# Patient Record
Sex: FEMALE | Race: BLACK OR AFRICAN AMERICAN | NOT HISPANIC OR LATINO | Employment: UNEMPLOYED | RURAL
[De-identification: names, ages, dates, MRNs, and addresses within clinical notes are randomized per-mention and may not be internally consistent; named-entity substitution may affect disease eponyms.]

---

## 2017-02-23 ENCOUNTER — HISTORICAL (OUTPATIENT)
Dept: ADMINISTRATIVE | Facility: HOSPITAL | Age: 32
End: 2017-02-23

## 2017-02-27 LAB
LAB AP CLINICAL INFORMATION: NORMAL
LAB AP GENERAL CAT - HISTORICAL: NORMAL
LAB AP INTERPRETATION/RESULT - HISTORICAL: NEGATIVE
LAB AP SPECIMEN ADEQUACY - HISTORICAL: NORMAL
LAB AP SPECIMEN SUBMITTED - HISTORICAL: NORMAL

## 2019-04-04 ENCOUNTER — HISTORICAL (OUTPATIENT)
Dept: ADMINISTRATIVE | Facility: HOSPITAL | Age: 34
End: 2019-04-04

## 2019-04-05 LAB
LAB AP COMMENTS: NORMAL
LAB AP GENERAL CAT - HISTORICAL: NORMAL
LAB AP INTERPRETATION/RESULT - HISTORICAL: NEGATIVE
LAB AP SPECIMEN ADEQUACY - HISTORICAL: NORMAL
LAB AP SPECIMEN SUBMITTED - HISTORICAL: NORMAL

## 2020-10-21 ENCOUNTER — HISTORICAL (OUTPATIENT)
Dept: ADMINISTRATIVE | Facility: HOSPITAL | Age: 35
End: 2020-10-21

## 2020-10-21 LAB
ABO: NORMAL
ANTIBODY SCREEN: NORMAL
BACTERIA #/AREA URNS HPF: ABNORMAL /HPF
BASOPHILS # BLD AUTO: 0.03 X10E3/UL (ref 0–0.2)
BASOPHILS NFR BLD AUTO: 0.4 % (ref 0–1)
BILIRUB UR QL STRIP: NEGATIVE MG/DL
CLARITY UR: ABNORMAL
CLARITY UR: ABNORMAL
COLOR UR: YELLOW
COLOR UR: YELLOW
EOSINOPHIL # BLD AUTO: 0.14 X10E3/UL (ref 0–0.5)
EOSINOPHIL NFR BLD AUTO: 1.7 % (ref 1–4)
ERYTHROCYTE [DISTWIDTH] IN BLOOD BY AUTOMATED COUNT: 12.3 % (ref 11.5–14.5)
GLUCOSE UR STRIP-MCNC: NEGATIVE MG/DL
HCG SERPL-ACNC: NORMAL MIU/ML
HCG UR QL IA.RAPID: POSITIVE
HCG UR QL IA.RAPID: POSITIVE
HCT VFR BLD AUTO: 40.8 % (ref 38–47)
HGB BLD-MCNC: 13.8 G/DL (ref 12–16)
IMM GRANULOCYTES # BLD AUTO: 0.01 X10E3/UL (ref 0–0.04)
IMM GRANULOCYTES NFR BLD: 0.1 % (ref 0–0.4)
KETONES UR STRIP-SCNC: NEGATIVE MG/DL
LEUKOCYTE ESTERASE UR QL STRIP: ABNORMAL LEU/UL
LYMPHOCYTES # BLD AUTO: 1.58 X10E3/UL (ref 1–4.8)
LYMPHOCYTES NFR BLD AUTO: 19.4 % (ref 27–41)
MCH RBC QN AUTO: 30.6 PG (ref 27–31)
MCHC RBC AUTO-ENTMCNC: 33.8 G/DL (ref 32–36)
MCV RBC AUTO: 90.5 FL (ref 80–96)
MONOCYTES # BLD AUTO: 0.56 X10E3/UL (ref 0–0.8)
MONOCYTES NFR BLD AUTO: 6.9 % (ref 2–6)
MPC BLD CALC-MCNC: 10.3 FL (ref 9.4–12.4)
MUCOUS THREADS #/AREA URNS HPF: ABNORMAL /HPF
NEUTROPHILS # BLD AUTO: 5.84 X10E3/UL (ref 1.8–7.7)
NEUTROPHILS NFR BLD AUTO: 71.5 % (ref 53–65)
NITRITE UR QL STRIP: NEGATIVE
NRBC # BLD AUTO: 0 X10E3/UL (ref 0–0)
NRBC, AUTO (.00): 0 /100 (ref 0–0)
PH UR STRIP: 6.5 PH UNITS (ref 5–8)
PLATELET # BLD AUTO: 274 X10E3/UL (ref 150–400)
PROT UR QL STRIP: ABNORMAL MG/DL
RBC # BLD AUTO: 4.51 X10E6/UL (ref 4.2–5.4)
RBC # UR STRIP: ABNORMAL ERY/UL
RBC #/AREA URNS HPF: ABNORMAL /HPF (ref 0–3)
RH TYPE: NORMAL
SP GR UR STRIP: 1.02 (ref 1–1.03)
SQUAMOUS #/AREA URNS LPF: ABNORMAL /LPF
TRICHOMONAS #/AREA URNS HPF: ABNORMAL /HPF
UROBILINOGEN UR STRIP-ACNC: 0.2 EU/DL
WBC # BLD AUTO: 8.16 X10E3/UL (ref 4.5–11)
WBC #/AREA URNS HPF: ABNORMAL /HPF (ref 0–5)

## 2020-10-23 LAB
REPORT: NORMAL

## 2020-11-12 ENCOUNTER — HISTORICAL (OUTPATIENT)
Dept: ADMINISTRATIVE | Facility: HOSPITAL | Age: 35
End: 2020-11-12

## 2020-11-12 LAB
APTT PPP: 31.3 SECONDS (ref 25.2–37.3)
BASOPHILS # BLD AUTO: 0.03 X10E3/UL (ref 0–0.2)
BASOPHILS NFR BLD AUTO: 0.4 % (ref 0–1)
EOSINOPHIL # BLD AUTO: 0.27 X10E3/UL (ref 0–0.5)
EOSINOPHIL NFR BLD AUTO: 3.3 % (ref 1–4)
ERYTHROCYTE [DISTWIDTH] IN BLOOD BY AUTOMATED COUNT: 11.9 % (ref 11.5–14.5)
HCT VFR BLD AUTO: 40.1 % (ref 38–47)
HGB BLD-MCNC: 13.4 G/DL (ref 12–16)
IMM GRANULOCYTES # BLD AUTO: 0.03 X10E3/UL (ref 0–0.04)
IMM GRANULOCYTES NFR BLD: 0.4 % (ref 0–0.4)
INR BLD: 1.05 (ref 0–3.3)
LYMPHOCYTES # BLD AUTO: 2.71 X10E3/UL (ref 1–4.8)
LYMPHOCYTES NFR BLD AUTO: 33.5 % (ref 27–41)
MAGNESIUM SERPL-MCNC: 1.8 MG/DL (ref 1.7–2.3)
MCH RBC QN AUTO: 31.2 PG (ref 27–31)
MCHC RBC AUTO-ENTMCNC: 33.4 G/DL (ref 32–36)
MCV RBC AUTO: 93.3 FL (ref 80–96)
MONOCYTES # BLD AUTO: 0.54 X10E3/UL (ref 0–0.8)
MONOCYTES NFR BLD AUTO: 6.7 % (ref 2–6)
MPC BLD CALC-MCNC: 9.8 FL (ref 9.4–12.4)
NEUTROPHILS # BLD AUTO: 4.52 X10E3/UL (ref 1.8–7.7)
NEUTROPHILS NFR BLD AUTO: 55.7 % (ref 53–65)
NRBC # BLD AUTO: 0 X10E3/UL (ref 0–0)
NRBC, AUTO (.00): 0 /100 (ref 0–0)
PLATELET # BLD AUTO: 267 X10E3/UL (ref 150–400)
PROTHROMBIN TIME: 13.2 SECONDS (ref 11.7–14.7)
RBC # BLD AUTO: 4.3 X10E6/UL (ref 4.2–5.4)
WBC # BLD AUTO: 8.1 X10E3/UL (ref 4.5–11)

## 2020-11-13 ENCOUNTER — HISTORICAL (OUTPATIENT)
Dept: ADMINISTRATIVE | Facility: HOSPITAL | Age: 35
End: 2020-11-13

## 2020-11-13 LAB
ALBUMIN SERPL BCP-MCNC: 3.2 G/DL (ref 3.5–5)
ALBUMIN/GLOB SERPL: 0.7 {RATIO}
ALP SERPL-CCNC: 72 U/L (ref 37–98)
ALT SERPL W P-5'-P-CCNC: 22 U/L (ref 13–56)
ANION GAP SERPL CALCULATED.3IONS-SCNC: 17 MMOL/L
AST SERPL W P-5'-P-CCNC: 38 U/L (ref 15–37)
BILIRUB SERPL-MCNC: 0.3 MG/DL (ref 0–1.2)
BUN SERPL-MCNC: 7 MG/DL (ref 7–18)
BUN/CREAT SERPL: 9.5
CALCIUM SERPL-MCNC: 8.8 MG/DL (ref 8.5–10.1)
CHLORIDE SERPL-SCNC: 101 MMOL/L (ref 98–107)
CK MB SERPL-MCNC: <1 NG/ML (ref 1–3.6)
CK SERPL-CCNC: 151 U/L (ref 26–192)
CO2 SERPL-SCNC: 18 MMOL/L (ref 21–32)
CREAT SERPL-MCNC: 0.74 MG/DL (ref 0.55–1.02)
GLOBULIN SER-MCNC: 4.5 G/DL (ref 2–4)
GLUCOSE SERPL-MCNC: 91 MG/DL (ref 74–106)
MYOGLOBIN SERPL-MCNC: 24 NG/ML (ref 13–71)
POTASSIUM SERPL-SCNC: 3.8 MMOL/L (ref 3.5–5.1)
PROT SERPL-MCNC: 7.7 G/DL (ref 6.4–8.2)
SODIUM SERPL-SCNC: 132 MMOL/L (ref 136–145)
TROPONIN I SERPL-MCNC: <0.017 NG/ML (ref 0–0.06)

## 2021-01-03 ENCOUNTER — HISTORICAL (OUTPATIENT)
Dept: ADMINISTRATIVE | Facility: HOSPITAL | Age: 36
End: 2021-01-03

## 2021-01-03 LAB
BACTERIA #/AREA URNS HPF: ABNORMAL /HPF
BILIRUB UR QL STRIP: NEGATIVE MG/DL
CLARITY UR: CLEAR
CLARITY UR: CLEAR
COLOR UR: ABNORMAL
COLOR UR: ABNORMAL
GLUCOSE UR STRIP-MCNC: NEGATIVE MG/DL
HCG UR QL IA.RAPID: POSITIVE
KETONES UR STRIP-SCNC: NEGATIVE MG/DL
LEUKOCYTE ESTERASE UR QL STRIP: ABNORMAL LEU/UL
MUCOUS THREADS #/AREA URNS HPF: ABNORMAL /HPF
NITRITE UR QL STRIP: NEGATIVE
PH UR STRIP: 6 PH UNITS (ref 5–8)
PROT UR QL STRIP: NEGATIVE MG/DL
RBC # UR STRIP: NEGATIVE ERY/UL
RBC #/AREA URNS HPF: ABNORMAL /HPF (ref 0–3)
SP GR UR STRIP: 1.02 (ref 1–1.03)
SQUAMOUS #/AREA URNS LPF: ABNORMAL /LPF
TRICHOMONAS #/AREA URNS HPF: ABNORMAL /HPF
UROBILINOGEN UR STRIP-ACNC: 0.2 EU/DL
WBC #/AREA URNS HPF: ABNORMAL /HPF (ref 0–5)
YEAST #/AREA URNS HPF: ABNORMAL /HPF

## 2022-07-12 ENCOUNTER — OFFICE VISIT (OUTPATIENT)
Dept: FAMILY MEDICINE | Facility: CLINIC | Age: 37
End: 2022-07-12
Payer: MEDICAID

## 2022-07-12 VITALS
RESPIRATION RATE: 17 BRPM | OXYGEN SATURATION: 98 % | SYSTOLIC BLOOD PRESSURE: 131 MMHG | WEIGHT: 293 LBS | TEMPERATURE: 99 F | DIASTOLIC BLOOD PRESSURE: 80 MMHG | HEART RATE: 86 BPM

## 2022-07-12 DIAGNOSIS — Z11.52 ENCOUNTER FOR SCREENING LABORATORY TESTING FOR COVID-19 VIRUS: Primary | ICD-10-CM

## 2022-07-12 DIAGNOSIS — U07.1 COVID-19 VIRUS INFECTION: ICD-10-CM

## 2022-07-12 LAB
CTP QC/QA: YES
FLUAV AG NPH QL: NEGATIVE
FLUBV AG NPH QL: NEGATIVE
SARS-COV-2 AG RESP QL IA.RAPID: POSITIVE

## 2022-07-12 PROCEDURE — 3079F PR MOST RECENT DIASTOLIC BLOOD PRESSURE 80-89 MM HG: ICD-10-PCS | Mod: CPTII,,, | Performed by: FAMILY MEDICINE

## 2022-07-12 PROCEDURE — 1159F PR MEDICATION LIST DOCUMENTED IN MEDICAL RECORD: ICD-10-PCS | Mod: CPTII,,, | Performed by: FAMILY MEDICINE

## 2022-07-12 PROCEDURE — 99203 OFFICE O/P NEW LOW 30 MIN: CPT | Mod: ,,, | Performed by: FAMILY MEDICINE

## 2022-07-12 PROCEDURE — 1159F MED LIST DOCD IN RCRD: CPT | Mod: CPTII,,, | Performed by: FAMILY MEDICINE

## 2022-07-12 PROCEDURE — 99203 PR OFFICE/OUTPT VISIT, NEW, LEVL III, 30-44 MIN: ICD-10-PCS | Mod: ,,, | Performed by: FAMILY MEDICINE

## 2022-07-12 PROCEDURE — 87428 SARSCOV & INF VIR A&B AG IA: CPT | Mod: RHCUB | Performed by: FAMILY MEDICINE

## 2022-07-12 PROCEDURE — 3079F DIAST BP 80-89 MM HG: CPT | Mod: CPTII,,, | Performed by: FAMILY MEDICINE

## 2022-07-12 PROCEDURE — 3075F PR MOST RECENT SYSTOLIC BLOOD PRESS GE 130-139MM HG: ICD-10-PCS | Mod: CPTII,,, | Performed by: FAMILY MEDICINE

## 2022-07-12 PROCEDURE — 3075F SYST BP GE 130 - 139MM HG: CPT | Mod: CPTII,,, | Performed by: FAMILY MEDICINE

## 2022-07-12 RX ORDER — LANOLIN ALCOHOL/MO/W.PET/CERES
1 CREAM (GRAM) TOPICAL 2 TIMES DAILY
COMMUNITY
Start: 2022-04-18

## 2022-07-12 RX ORDER — BUPROPION HYDROCHLORIDE 150 MG/1
TABLET, EXTENDED RELEASE ORAL
COMMUNITY
Start: 2022-07-04

## 2022-07-12 RX ORDER — CEPHRADINE 500 MG
CAPSULE ORAL
COMMUNITY
Start: 2022-04-18 | End: 2023-04-13

## 2022-07-12 RX ORDER — CITALOPRAM 40 MG/1
40 TABLET, FILM COATED ORAL DAILY
COMMUNITY
Start: 2022-04-15

## 2022-07-12 NOTE — LETTER
July 12, 2022      [facility]  [address] [place] [address]  Phone: [phone]  Fax: [phone]       Patient: Jeanette Houston   YOB: 1985  Date of Visit: 07/12/2022    To Whom It May Concern:    Feliberto Houston  was at [facility] on 07/12/2022. The patient may return to work/school on 07/18/2022 with no restrictions. If you have any questions or concerns, or if I can be of further assistance, please do not hesitate to contact me.    Sincerely,    Dr.Jacob Ricks

## 2022-07-12 NOTE — PROGRESS NOTES
Subjective:       Patient ID: Jeanette Houston is a 37 y.o. female.    Chief Complaint: COVID-19 Concerns (No symptoms. Possible exposure. )    Sinus drainage for several days no fever cough or fatigue    Review of Systems      Objective:      Physical Exam  Constitutional:       General: She is not in acute distress.     Appearance: She is obese. She is not ill-appearing.   HENT:      Right Ear: Tympanic membrane normal.      Left Ear: Tympanic membrane normal.      Nose: Congestion and rhinorrhea present.   Cardiovascular:      Rate and Rhythm: Normal rate and regular rhythm.   Pulmonary:      Effort: Pulmonary effort is normal.      Breath sounds: Normal breath sounds.   Neurological:      Mental Status: She is alert.         Assessment:       Problem List Items Addressed This Visit    None     Visit Diagnoses     Encounter for screening laboratory testing for COVID-19 virus    -  Primary    Relevant Orders    POCT SARS-COV2 (COVID) with Flu Antigen          Plan:     paxlovid.  Patient is morbidly obese

## 2022-10-12 ENCOUNTER — HOSPITAL ENCOUNTER (EMERGENCY)
Facility: HOSPITAL | Age: 37
Discharge: HOME | End: 2022-10-12
Payer: MEDICAID

## 2022-10-12 VITALS
RESPIRATION RATE: 20 BRPM | WEIGHT: 293 LBS | BODY MASS INDEX: 45.99 KG/M2 | DIASTOLIC BLOOD PRESSURE: 65 MMHG | HEART RATE: 80 BPM | TEMPERATURE: 99 F | SYSTOLIC BLOOD PRESSURE: 119 MMHG | HEIGHT: 67 IN | OXYGEN SATURATION: 99 %

## 2022-10-12 DIAGNOSIS — M54.30 SCIATICA, UNSPECIFIED LATERALITY: Primary | ICD-10-CM

## 2022-10-12 PROCEDURE — 63600175 PHARM REV CODE 636 W HCPCS: Performed by: NURSE PRACTITIONER

## 2022-10-12 PROCEDURE — 99283 EMERGENCY DEPT VISIT LOW MDM: CPT | Mod: ,,, | Performed by: NURSE PRACTITIONER

## 2022-10-12 PROCEDURE — 96374 THER/PROPH/DIAG INJ IV PUSH: CPT

## 2022-10-12 PROCEDURE — 99283 PR EMERGENCY DEPT VISIT,LEVEL III: ICD-10-PCS | Mod: ,,, | Performed by: NURSE PRACTITIONER

## 2022-10-12 PROCEDURE — 99285 EMERGENCY DEPT VISIT HI MDM: CPT | Mod: 25

## 2022-10-12 PROCEDURE — 96372 THER/PROPH/DIAG INJ SC/IM: CPT

## 2022-10-12 RX ORDER — KETOROLAC TROMETHAMINE 30 MG/ML
30 INJECTION, SOLUTION INTRAMUSCULAR; INTRAVENOUS
Status: DISCONTINUED | OUTPATIENT
Start: 2022-10-12 | End: 2022-10-12

## 2022-10-12 RX ORDER — DEXAMETHASONE SODIUM PHOSPHATE 4 MG/ML
6 INJECTION, SOLUTION INTRA-ARTICULAR; INTRALESIONAL; INTRAMUSCULAR; INTRAVENOUS; SOFT TISSUE
Status: COMPLETED | OUTPATIENT
Start: 2022-10-12 | End: 2022-10-12

## 2022-10-12 RX ORDER — LEVOTHYROXINE SODIUM 100 UG/1
100 TABLET ORAL
Status: ON HOLD | COMMUNITY
End: 2022-11-14 | Stop reason: HOSPADM

## 2022-10-12 RX ORDER — METHOCARBAMOL 500 MG/1
1000 TABLET, FILM COATED ORAL 3 TIMES DAILY PRN
Qty: 30 TABLET | Refills: 0 | Status: SHIPPED | OUTPATIENT
Start: 2022-10-12 | End: 2022-10-17

## 2022-10-12 RX ORDER — ORPHENADRINE CITRATE 30 MG/ML
30 INJECTION INTRAMUSCULAR; INTRAVENOUS
Status: COMPLETED | OUTPATIENT
Start: 2022-10-12 | End: 2022-10-12

## 2022-10-12 RX ORDER — PREDNISONE 50 MG/1
50 TABLET ORAL DAILY
Qty: 5 TABLET | Refills: 0 | Status: SHIPPED | OUTPATIENT
Start: 2022-10-12 | End: 2022-10-17

## 2022-10-12 RX ORDER — IBUPROFEN 800 MG/1
800 TABLET ORAL EVERY 6 HOURS PRN
Qty: 20 TABLET | Refills: 0 | Status: SHIPPED | OUTPATIENT
Start: 2022-10-12 | End: 2022-11-14

## 2022-10-12 RX ADMIN — DEXAMETHASONE SODIUM PHOSPHATE 6 MG: 4 INJECTION, SOLUTION INTRAMUSCULAR; INTRAVENOUS at 04:10

## 2022-10-12 RX ADMIN — ORPHENADRINE CITRATE 30 MG: 30 INJECTION INTRAMUSCULAR; INTRAVENOUS at 04:10

## 2022-10-12 NOTE — DISCHARGE INSTRUCTIONS
Take prednisone as directed. Follow up with ; you should be contacted with an appointment. Return to the emergency department for any increase in symptoms or for any other new or worrisome symptoms.

## 2022-10-12 NOTE — ED PROVIDER NOTES
Encounter Date: 10/12/2022       History     Chief Complaint   Patient presents with    Back Pain       37 year old female presents to the emergency department to be evaluated for low back pain. Her pain radiates down her left lower extremity. Denies any recent fall or injury. She has had some mild pain in her back for the last 6 months, but it got much worse yesterday. Denies any dysuria, fever, chills, nausea, vomiting.     The history is provided by the patient.   Back Pain   This is a chronic problem. Pertinent negatives include no chest pain, no fever, no numbness, no weight loss, no headaches, no abdominal pain, no abdominal swelling, no bowel incontinence, no perianal numbness, no bladder incontinence, no dysuria, no pelvic pain, no leg pain, no paresthesias, no paresis, no tingling and no weakness.   Review of patient's allergies indicates:   Allergen Reactions    Paroxetine Anxiety     No past medical history on file.  No past surgical history on file.  No family history on file.  Social History     Tobacco Use    Smoking status: Never    Smokeless tobacco: Never     Review of Systems   Constitutional:  Negative for fever and weight loss.   Cardiovascular:  Negative for chest pain.   Gastrointestinal:  Negative for abdominal pain and bowel incontinence.   Genitourinary:  Negative for bladder incontinence, dysuria and pelvic pain.   Musculoskeletal:  Positive for back pain.   Neurological:  Negative for tingling, weakness, numbness, headaches and paresthesias.   All other systems reviewed and are negative.    Physical Exam     Initial Vitals [10/12/22 1603]   BP Pulse Resp Temp SpO2   119/65 80 20 98.5 °F (36.9 °C) 99 %      MAP       --         Physical Exam    Vitals reviewed.  Constitutional: She appears well-developed and well-nourished.   Neck: Neck supple.   Cardiovascular:  Normal rate and regular rhythm.           Pulmonary/Chest: Breath sounds normal.   Abdominal: Abdomen is soft. Bowel sounds are  normal. She exhibits no distension and no mass. There is no abdominal tenderness. There is no rebound and no guarding.   Musculoskeletal:      Cervical back: Normal and neck supple.      Thoracic back: Normal.      Lumbar back: Tenderness and bony tenderness present. No swelling, edema, deformity, signs of trauma, lacerations or spasms. Normal range of motion. Positive left straight leg raise test. Negative right straight leg raise test. No scoliosis.     Neurological: She is alert and oriented to person, place, and time. She has normal strength. GCS score is 15. GCS eye subscore is 4. GCS verbal subscore is 5. GCS motor subscore is 6.   Skin: Skin is warm and dry. Capillary refill takes less than 2 seconds.   Psychiatric: She has a normal mood and affect.       Medical Screening Exam   See Full Note    ED Course   Procedures  Labs Reviewed - No data to display       Imaging Results              CT Lumbar Spine Without Contrast (Final result)  Result time 10/12/22 15:54:57      Final result by Syed Higuera MD (10/12/22 15:54:57)                   Impression:      No acute fracture or dislocation the lumbar spine.      Electronically signed by: Syed Higuera  Date:    10/12/2022  Time:    15:54               Narrative:    EXAMINATION:  CT LUMBAR SPINE WITHOUT CONTRAST    CLINICAL HISTORY:  Low back pain, cancer suspected;    TECHNIQUE:  Low-dose axial, sagittal and coronal reformations are obtained through the lumbar spine.  Contrast was not administered.    COMPARISON:  MRI 09/11/2018    FINDINGS:  There is no fracture.  No dislocation.  The vertebral body heights and alignment are maintained.  No spinal canal or foraminal stenoses identified.                                       Medications   dexAMETHasone injection 6 mg (has no administration in time range)                       Clinical Impression:   Final diagnoses:  [M54.30] Sciatica, unspecified laterality (Primary)      ED Disposition Condition    Discharge  Stable          ED Prescriptions       Medication Sig Dispense Start Date End Date Auth. Provider    predniSONE (DELTASONE) 50 MG Tab Take 1 tablet (50 mg total) by mouth once daily. for 5 days 5 tablet 10/12/2022 10/17/2022 LUIS E Mac          Follow-up Information    None          LUIS E Mac  10/12/22 1606       LUIS E Mac  10/12/22 1606

## 2022-11-03 DIAGNOSIS — M54.16 LUMBAR RADICULOPATHY: Primary | ICD-10-CM

## 2022-11-11 ENCOUNTER — HOSPITAL ENCOUNTER (OUTPATIENT)
Facility: HOSPITAL | Age: 37
Discharge: HOME | End: 2022-11-14
Attending: EMERGENCY MEDICINE | Admitting: INTERNAL MEDICINE
Payer: MEDICAID

## 2022-11-11 DIAGNOSIS — H46.9 OPTIC NEURITIS: Primary | ICD-10-CM

## 2022-11-11 DIAGNOSIS — F32.A DEPRESSION, UNSPECIFIED DEPRESSION TYPE: ICD-10-CM

## 2022-11-11 DIAGNOSIS — R07.9 CHEST PAIN: ICD-10-CM

## 2022-11-11 DIAGNOSIS — C73 FOLLICULAR CANCER OF THYROID: ICD-10-CM

## 2022-11-11 DIAGNOSIS — E66.01 CLASS 3 OBESITY: ICD-10-CM

## 2022-11-11 DIAGNOSIS — I63.9 CVA (CEREBRAL VASCULAR ACCIDENT): ICD-10-CM

## 2022-11-11 LAB
ALBUMIN SERPL BCP-MCNC: 3.1 G/DL (ref 3.5–5)
ALBUMIN/GLOB SERPL: 0.8 {RATIO}
ALP SERPL-CCNC: 87 U/L (ref 37–98)
ALT SERPL W P-5'-P-CCNC: 22 U/L (ref 13–56)
ANION GAP SERPL CALCULATED.3IONS-SCNC: 11 MMOL/L (ref 7–16)
AST SERPL W P-5'-P-CCNC: 25 U/L (ref 15–37)
BASOPHILS # BLD AUTO: 0.03 K/UL (ref 0–0.2)
BASOPHILS NFR BLD AUTO: 0.4 % (ref 0–1)
BILIRUB SERPL-MCNC: 0.2 MG/DL (ref ?–1.2)
BUN SERPL-MCNC: 10 MG/DL (ref 7–18)
BUN/CREAT SERPL: 8 (ref 6–20)
CALCIUM SERPL-MCNC: 8.5 MG/DL (ref 8.5–10.1)
CHLORIDE SERPL-SCNC: 106 MMOL/L (ref 98–107)
CO2 SERPL-SCNC: 26 MMOL/L (ref 21–32)
CREAT SERPL-MCNC: 1.2 MG/DL (ref 0.55–1.02)
CRP SERPL-MCNC: 1.86 MG/DL (ref 0–0.8)
DIFFERENTIAL METHOD BLD: NORMAL
EGFR (NO RACE VARIABLE) (RUSH/TITUS): 61 ML/MIN/1.73M²
EOSINOPHIL # BLD AUTO: 0.27 K/UL (ref 0–0.5)
EOSINOPHIL NFR BLD AUTO: 3.2 % (ref 1–4)
ERYTHROCYTE [DISTWIDTH] IN BLOOD BY AUTOMATED COUNT: 12.5 % (ref 11.5–14.5)
ERYTHROCYTE [SEDIMENTATION RATE] IN BLOOD BY WESTERGREN METHOD: 21 MM/HR (ref 0–20)
GLOBULIN SER-MCNC: 4 G/DL (ref 2–4)
GLUCOSE SERPL-MCNC: 88 MG/DL (ref 74–106)
HCT VFR BLD AUTO: 41.3 % (ref 38–47)
HGB BLD-MCNC: 13.5 G/DL (ref 12–16)
IMM GRANULOCYTES # BLD AUTO: 0.01 K/UL (ref 0–0.04)
IMM GRANULOCYTES NFR BLD: 0.1 % (ref 0–0.4)
LYMPHOCYTES # BLD AUTO: 2.31 K/UL (ref 1–4.8)
LYMPHOCYTES NFR BLD AUTO: 27.1 % (ref 27–41)
MCH RBC QN AUTO: 30 PG (ref 27–31)
MCHC RBC AUTO-ENTMCNC: 32.7 G/DL (ref 32–36)
MCV RBC AUTO: 91.8 FL (ref 80–96)
MONOCYTES # BLD AUTO: 0.47 K/UL (ref 0–0.8)
MONOCYTES NFR BLD AUTO: 5.5 % (ref 2–6)
MPC BLD CALC-MCNC: 9.6 FL (ref 9.4–12.4)
NEUTROPHILS # BLD AUTO: 5.42 K/UL (ref 1.8–7.7)
NEUTROPHILS NFR BLD AUTO: 63.7 % (ref 53–65)
NRBC # BLD AUTO: 0 X10E3/UL
NRBC, AUTO (.00): 0 %
PLATELET # BLD AUTO: 319 K/UL (ref 150–400)
POTASSIUM SERPL-SCNC: 4.4 MMOL/L (ref 3.5–5.1)
PROT SERPL-MCNC: 7.1 G/DL (ref 6.4–8.2)
RBC # BLD AUTO: 4.5 M/UL (ref 4.2–5.4)
SODIUM SERPL-SCNC: 139 MMOL/L (ref 136–145)
WBC # BLD AUTO: 8.51 K/UL (ref 4.5–11)

## 2022-11-11 PROCEDURE — 99285 EMERGENCY DEPT VISIT HI MDM: CPT | Mod: 25

## 2022-11-11 PROCEDURE — 96376 TX/PRO/DX INJ SAME DRUG ADON: CPT

## 2022-11-11 PROCEDURE — 99220 PR INITIAL OBSERVATION CARE,LEVL III: CPT | Mod: ,,, | Performed by: INTERNAL MEDICINE

## 2022-11-11 PROCEDURE — 63600175 PHARM REV CODE 636 W HCPCS: Performed by: EMERGENCY MEDICINE

## 2022-11-11 PROCEDURE — 85651 RBC SED RATE NONAUTOMATED: CPT | Performed by: EMERGENCY MEDICINE

## 2022-11-11 PROCEDURE — 36415 COLL VENOUS BLD VENIPUNCTURE: CPT | Performed by: EMERGENCY MEDICINE

## 2022-11-11 PROCEDURE — 96374 THER/PROPH/DIAG INJ IV PUSH: CPT

## 2022-11-11 PROCEDURE — 99220 PR INITIAL OBSERVATION CARE,LEVL III: ICD-10-PCS | Mod: ,,, | Performed by: INTERNAL MEDICINE

## 2022-11-11 PROCEDURE — 86140 C-REACTIVE PROTEIN: CPT | Performed by: EMERGENCY MEDICINE

## 2022-11-11 PROCEDURE — 99284 PR EMERGENCY DEPT VISIT,LEVEL IV: ICD-10-PCS | Mod: ,,, | Performed by: EMERGENCY MEDICINE

## 2022-11-11 PROCEDURE — 80053 COMPREHEN METABOLIC PANEL: CPT | Performed by: EMERGENCY MEDICINE

## 2022-11-11 PROCEDURE — 99284 EMERGENCY DEPT VISIT MOD MDM: CPT | Mod: ,,, | Performed by: EMERGENCY MEDICINE

## 2022-11-11 PROCEDURE — 85025 COMPLETE CBC W/AUTO DIFF WBC: CPT | Performed by: EMERGENCY MEDICINE

## 2022-11-11 RX ORDER — METHYLPREDNISOLONE SOD SUCC 125 MG
250 VIAL (EA) INJECTION
Status: COMPLETED | OUTPATIENT
Start: 2022-11-11 | End: 2022-11-11

## 2022-11-11 RX ORDER — BUPROPION HYDROCHLORIDE 150 MG/1
150 TABLET, EXTENDED RELEASE ORAL DAILY
Status: DISCONTINUED | OUTPATIENT
Start: 2022-11-12 | End: 2022-11-14 | Stop reason: HOSPADM

## 2022-11-11 RX ORDER — LIOTHYRONINE SODIUM 25 UG/1
25 TABLET ORAL 2 TIMES DAILY
Status: DISCONTINUED | OUTPATIENT
Start: 2022-11-12 | End: 2022-11-12 | Stop reason: CLARIF

## 2022-11-11 RX ORDER — CITALOPRAM 20 MG/1
40 TABLET, FILM COATED ORAL EVERY EVENING
Status: DISCONTINUED | OUTPATIENT
Start: 2022-11-12 | End: 2022-11-14 | Stop reason: HOSPADM

## 2022-11-11 RX ORDER — LANOLIN ALCOHOL/MO/W.PET/CERES
400 CREAM (GRAM) TOPICAL 2 TIMES DAILY
Status: DISCONTINUED | OUTPATIENT
Start: 2022-11-12 | End: 2022-11-14 | Stop reason: HOSPADM

## 2022-11-11 RX ADMIN — METHYLPREDNISOLONE SODIUM SUCCINATE 250 MG: 125 INJECTION, POWDER, FOR SOLUTION INTRAMUSCULAR; INTRAVENOUS at 06:11

## 2022-11-11 NOTE — LETTER
November 14, 2022         [address] [place] [address]  Phone: [phone]  Fax: [phone]       Patient: Jeanette Houston   YOB: 1985  Date of Visit: 11/14/2022    To Whom It May Concern:    Feliberto Houston  was at [facility] on 11/11/2022 -  11/14/2022. If you have any questions or concerns, or if I can be of further assistance, please do not hesitate to contact me.    Sincerely,    Delilah Sneed RN

## 2022-11-11 NOTE — ED TRIAGE NOTES
PATIENT PRESENTS TO ER WITH REPORT OF VISUAL LOSS TO LEFT EYE. WENT TO EYE DR AND HAD EMERGENT MRI AND WAS TOLD TO COME TO [facility] FOR ADMISSION

## 2022-11-12 PROCEDURE — 96376 TX/PRO/DX INJ SAME DRUG ADON: CPT

## 2022-11-12 PROCEDURE — 96376 TX/PRO/DX INJ SAME DRUG ADON: CPT | Mod: 59

## 2022-11-12 PROCEDURE — G0378 HOSPITAL OBSERVATION PER HR: HCPCS

## 2022-11-12 PROCEDURE — 99215 PR OFFICE/OUTPT VISIT, EST, LEVL V, 40-54 MIN: ICD-10-PCS | Mod: ,,, | Performed by: HOSPITALIST

## 2022-11-12 PROCEDURE — 25000003 PHARM REV CODE 250: Performed by: INTERNAL MEDICINE

## 2022-11-12 PROCEDURE — 63600175 PHARM REV CODE 636 W HCPCS: Performed by: EMERGENCY MEDICINE

## 2022-11-12 PROCEDURE — 99215 OFFICE O/P EST HI 40 MIN: CPT | Mod: ,,, | Performed by: HOSPITALIST

## 2022-11-12 PROCEDURE — 63600175 PHARM REV CODE 636 W HCPCS: Performed by: INTERNAL MEDICINE

## 2022-11-12 PROCEDURE — 11000001 HC ACUTE MED/SURG PRIVATE ROOM

## 2022-11-12 RX ORDER — NALOXONE HCL 0.4 MG/ML
0.02 VIAL (ML) INJECTION
Status: DISCONTINUED | OUTPATIENT
Start: 2022-11-12 | End: 2022-11-14 | Stop reason: HOSPADM

## 2022-11-12 RX ORDER — METHYLPREDNISOLONE SOD SUCC 125 MG
250 VIAL (EA) INJECTION ONCE
Status: COMPLETED | OUTPATIENT
Start: 2022-11-12 | End: 2022-11-12

## 2022-11-12 RX ORDER — HYDROCODONE BITARTRATE AND ACETAMINOPHEN 7.5; 325 MG/1; MG/1
1 TABLET ORAL EVERY 6 HOURS PRN
Status: DISCONTINUED | OUTPATIENT
Start: 2022-11-12 | End: 2022-11-14 | Stop reason: HOSPADM

## 2022-11-12 RX ORDER — METHYLPREDNISOLONE SOD SUCC 125 MG
250 VIAL (EA) INJECTION EVERY 6 HOURS
Status: DISCONTINUED | OUTPATIENT
Start: 2022-11-12 | End: 2022-11-14 | Stop reason: HOSPADM

## 2022-11-12 RX ORDER — ONDANSETRON 2 MG/ML
4 INJECTION INTRAMUSCULAR; INTRAVENOUS EVERY 8 HOURS PRN
Status: DISCONTINUED | OUTPATIENT
Start: 2022-11-12 | End: 2022-11-14 | Stop reason: HOSPADM

## 2022-11-12 RX ORDER — SODIUM CHLORIDE 0.9 % (FLUSH) 0.9 %
10 SYRINGE (ML) INJECTION EVERY 12 HOURS PRN
Status: DISCONTINUED | OUTPATIENT
Start: 2022-11-12 | End: 2022-11-14 | Stop reason: HOSPADM

## 2022-11-12 RX ADMIN — METHYLPREDNISOLONE SODIUM SUCCINATE 250 MG: 125 INJECTION, POWDER, FOR SOLUTION INTRAMUSCULAR; INTRAVENOUS at 11:11

## 2022-11-12 RX ADMIN — CITALOPRAM HYDROBROMIDE 40 MG: 20 TABLET ORAL at 05:11

## 2022-11-12 RX ADMIN — HYDROCODONE BITARTRATE AND ACETAMINOPHEN 1 TABLET: 7.5; 325 TABLET ORAL at 11:11

## 2022-11-12 RX ADMIN — METHYLPREDNISOLONE SODIUM SUCCINATE 250 MG: 125 INJECTION, POWDER, FOR SOLUTION INTRAMUSCULAR; INTRAVENOUS at 12:11

## 2022-11-12 RX ADMIN — HYDROCODONE BITARTRATE AND ACETAMINOPHEN 1 TABLET: 7.5; 325 TABLET ORAL at 05:11

## 2022-11-12 RX ADMIN — Medication 400 MG: at 09:11

## 2022-11-12 RX ADMIN — METHYLPREDNISOLONE SODIUM SUCCINATE 250 MG: 125 INJECTION, POWDER, FOR SOLUTION INTRAMUSCULAR; INTRAVENOUS at 06:11

## 2022-11-12 RX ADMIN — Medication 400 MG: at 12:11

## 2022-11-12 RX ADMIN — BUPROPION HYDROCHLORIDE 150 MG: 150 TABLET, EXTENDED RELEASE ORAL at 09:11

## 2022-11-12 RX ADMIN — METHYLPREDNISOLONE SODIUM SUCCINATE 250 MG: 125 INJECTION, POWDER, FOR SOLUTION INTRAMUSCULAR; INTRAVENOUS at 05:11

## 2022-11-12 RX ADMIN — HYDROCODONE BITARTRATE AND ACETAMINOPHEN 1 TABLET: 7.5; 325 TABLET ORAL at 12:11

## 2022-11-12 NOTE — PLAN OF CARE
Problem: Adult Inpatient Plan of Care  Goal: Plan of Care Review  Outcome: Ongoing, Progressing  Goal: Patient-Specific Goal (Individualized)  Outcome: Ongoing, Progressing  Goal: Absence of Hospital-Acquired Illness or Injury  Outcome: Ongoing, Progressing  Goal: Optimal Comfort and Wellbeing  Outcome: Ongoing, Progressing  Goal: Readiness for Transition of Care  Outcome: Ongoing, Progressing     Problem: Bariatric Environmental Safety  Goal: Safety Maintained with Care  Outcome: Ongoing, Progressing     Problem: Pain Acute  Goal: Acceptable Pain Control and Functional Ability  Outcome: Ongoing, Progressing

## 2022-11-12 NOTE — ASSESSMENT & PLAN NOTE
Body mass index is 48.94 kg/m². Morbid obesity complicates all aspects of disease management from diagnostic modalities to treatment. Weight loss encouraged and health benefits explained to patient.

## 2022-11-12 NOTE — H&P
[facility] - Emergency Department  [facility]  History & Physical    Patient Name: Jeanette Houston  MRN: [id number]  Patient Class: Emergency  Admission Date: 11/11/2022  Attending Physician: CHANELLE Shearer MD  Primary Care Provider: Kristyn Flood MD         Patient information was obtained from patient and ER records.     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 Retinal examination along with MRI was suggestive of a presence of optic neuritis and after consultation with , a neurologist, a decision was made to admit this patient to this hospital for high-dose steroid therapy.  Patient will be started on Solu-Medrol 250 mg IV q.6 hours times 3 days.      Past Medical History:   Diagnosis Date    Depression     Follicular cancer of thyroid        Past Surgical History:   Procedure Laterality Date    THYROIDECTOMY, BILATERAL Bilateral        Review of patient's allergies indicates:   Allergen Reactions    Paroxetine Anxiety       No current facility-administered medications on file prior to encounter.     Current Outpatient Medications on File Prior to Encounter   Medication Sig    buPROPion (WELLBUTRIN SR) 150 MG TBSR 12 hr tablet Wellbutrin  MG Oral Tablet Extended Release 12 Hour QTY: 90 tablet Days: 90 Refills: 1  Written: 07/04/22 Patient Instructions: once a day    cholecalciferol, vitamin D3, (VITAMIN D3) 250 mcg (10,000 unit) Cap capsule Vitamin D3 250 MCG (03671 UT) Oral Capsule QTY: 90 capsule Days: 90 Refills: 3  Written: 04/18/22  Patient Instructions: once a day    citalopram (CELEXA) 40 MG tablet Take 40 mg by mouth once daily.    ibuprofen (ADVIL,MOTRIN) 800 MG tablet Take 1 tablet (800 mg total) by mouth every 6 (six) hours as needed for Pain.    levothyroxine (SYNTHROID) 100 MCG tablet Take 100 mcg by mouth before breakfast.    magnesium oxide (MAG-OX) 400 mg (241.3 mg magnesium) tablet Take 1 tablet by mouth 2 (two) times daily.     Family History    None       Tobacco Use    Smoking status: Never    Smokeless tobacco: Never   Substance and Sexual Activity    Alcohol use: Not on file    Drug use: Not on file    Sexual activity: Not on file     Review of Systems   Constitutional:  Negative for chills and fever.   HENT:  Negative for postnasal drip and rhinorrhea.    Eyes:  Positive for pain and visual disturbance. Negative for itching.   Respiratory:  Negative for shortness of breath.    Cardiovascular:  Negative for chest pain, palpitations and leg swelling.   Gastrointestinal:  Negative for abdominal distention, abdominal pain, diarrhea, nausea and vomiting.   Endocrine: Negative for cold intolerance, heat intolerance, polydipsia, polyphagia and polyuria.   Genitourinary:  Negative for dysuria and hematuria.   Musculoskeletal:  Negative for arthralgias, joint swelling, myalgias, neck pain and neck stiffness.   Skin:  Negative for pallor and rash.   Allergic/Immunologic: Negative for environmental allergies, food allergies and immunocompromised state.   Neurological:  Negative for dizziness, seizures, facial asymmetry, light-headedness and numbness.   Objective:     Vital Signs (Most Recent):  Temp: 99.5 °F (37.5 °C) (11/11/22 1737)  Pulse: 82 (11/11/22 2227)  Resp: 18 (11/11/22 1737)  BP: (!) 149/77 (11/11/22 2227)  SpO2: 99 % (11/11/22 2227)   Vital Signs (24h Range):  Temp:  [99.5 °F (37.5 °C)] 99.5 °F (37.5 °C)  Pulse:  [80-89] 82  Resp:  [18] 18  SpO2:  [98 %-100 %] 99 %  BP: (124-174)/(63-85) 149/77     Weight: (!) 141.7  kg (312 lb 8 oz)  Body mass index is 48.94 kg/m².    Physical Exam  Vitals reviewed.   Constitutional:       General: She is not in acute distress.     Appearance: Normal appearance.   HENT:      Head: Normocephalic and atraumatic.      Right Ear: External ear normal.      Left Ear: External ear normal.   Eyes:      Extraocular Movements: Extraocular movements intact.      Pupils: Pupils are equal, round, and reactive to light.   Cardiovascular:      Rate and Rhythm: Normal rate and regular rhythm.      Pulses: Normal pulses.      Heart sounds: Normal heart sounds. No murmur heard.  Pulmonary:      Effort: Pulmonary effort is normal. No respiratory distress.      Breath sounds: Normal breath sounds. No wheezing.   Chest:      Chest wall: No tenderness.   Abdominal:      General: Abdomen is flat.      Palpations: Abdomen is soft. There is no mass.      Tenderness: There is no abdominal tenderness. There is no right CVA tenderness or left CVA tenderness.   Musculoskeletal:         General: No swelling or tenderness. Normal range of motion.   Skin:     General: Skin is warm and dry.      Capillary Refill: Capillary refill takes less than 2 seconds.   Neurological:      General: No focal deficit present.      Mental Status: She is alert and oriented to person, place, and time. Mental status is at baseline.   Psychiatric:         Mood and Affect: Mood normal.         Thought Content: Thought content normal.     Cranial nerves 2-12 were grossly intact     Significant Labs: All pertinent labs within the past 24 hours have been reviewed.      Assessment/Plan:     * Optic neuritis    Patient had a 5 day history L eye pain on performing extraocular movements and a 2 day history of complete blindness involving L eye.  Patient was subsequently seen by a retinal specialist , and after MRI, patient was diagnosed with optic neuritis.   recommended an admission with high dose steroid regimen for a period of 3  days.  Literature suggests that optic neuritis can sometimes be associated with multiple sclerosis, but patient does not have any symptoms suggestive of multiple sclerosis at this time.  Patient is to follow-up with Dr. Elise and Dr. Noel after discharge        Follicular cancer of thyroid    Patient had resection of left lobe of thyroid in 2014 for multinodular goiter with symptoms associated with mass effect, but it was benign.  However, when she had resection of right lobe of thyroid in 2022, patient was found to have follicular cancer of thyroid.  Patient is currently being treated with Cytomel in preparation for GONZALEZ.  Patient was instructed to follow-up with Dr. Gray at [facility]      Morbid obesity    Body mass index is 48.94 kg/m². Morbid obesity complicates all aspects of disease management from diagnostic modalities to treatment. Weight loss encouraged and health benefits explained to patient.         Depression    Adequately controlled on current regimen continue present management          VTE Risk Mitigation (From admission, onward)    None             Bib Ambrose MD  Department of Hospital Medicine   [facility] - Emergency Department

## 2022-11-12 NOTE — SUBJECTIVE & OBJECTIVE
Past Medical History:   Diagnosis Date    Depression     Follicular cancer of thyroid        Past Surgical History:   Procedure Laterality Date    THYROIDECTOMY, BILATERAL Bilateral        Review of patient's allergies indicates:   Allergen Reactions    Paroxetine Anxiety       No current facility-administered medications on file prior to encounter.     Current Outpatient Medications on File Prior to Encounter   Medication Sig    buPROPion (WELLBUTRIN SR) 150 MG TBSR 12 hr tablet Wellbutrin  MG Oral Tablet Extended Release 12 Hour QTY: 90 tablet Days: 90 Refills: 1  Written: 07/04/22 Patient Instructions: once a day    cholecalciferol, vitamin D3, (VITAMIN D3) 250 mcg (10,000 unit) Cap capsule Vitamin D3 250 MCG (86590 UT) Oral Capsule QTY: 90 capsule Days: 90 Refills: 3  Written: 04/18/22 Patient Instructions: once a day    citalopram (CELEXA) 40 MG tablet Take 40 mg by mouth once daily.    ibuprofen (ADVIL,MOTRIN) 800 MG tablet Take 1 tablet (800 mg total) by mouth every 6 (six) hours as needed for Pain.    levothyroxine (SYNTHROID) 100 MCG tablet Take 100 mcg by mouth before breakfast.    magnesium oxide (MAG-OX) 400 mg (241.3 mg magnesium) tablet Take 1 tablet by mouth 2 (two) times daily.     Family History    None       Tobacco Use    Smoking status: Never    Smokeless tobacco: Never   Substance and Sexual Activity    Alcohol use: Not on file    Drug use: Not on file    Sexual activity: Not on file     Review of Systems   Constitutional:  Negative for chills and fever.   HENT:  Negative for postnasal drip and rhinorrhea.    Eyes:  Positive for pain and visual disturbance. Negative for itching.   Respiratory:  Negative for shortness of breath.    Cardiovascular:  Negative for chest pain, palpitations and leg swelling.   Gastrointestinal:  Negative for abdominal distention, abdominal pain, diarrhea, nausea and vomiting.   Endocrine: Negative for cold intolerance, heat intolerance, polydipsia, polyphagia  and polyuria.   Genitourinary:  Negative for dysuria and hematuria.   Musculoskeletal:  Negative for arthralgias, joint swelling, myalgias, neck pain and neck stiffness.   Skin:  Negative for pallor and rash.   Allergic/Immunologic: Negative for environmental allergies, food allergies and immunocompromised state.   Neurological:  Negative for dizziness, seizures, facial asymmetry, light-headedness and numbness.   Objective:     Vital Signs (Most Recent):  Temp: 99.5 °F (37.5 °C) (11/11/22 1737)  Pulse: 82 (11/11/22 2227)  Resp: 18 (11/11/22 1737)  BP: (!) 149/77 (11/11/22 2227)  SpO2: 99 % (11/11/22 2227)   Vital Signs (24h Range):  Temp:  [99.5 °F (37.5 °C)] 99.5 °F (37.5 °C)  Pulse:  [80-89] 82  Resp:  [18] 18  SpO2:  [98 %-100 %] 99 %  BP: (124-174)/(63-85) 149/77     Weight: (!) 141.7 kg (312 lb 8 oz)  Body mass index is 48.94 kg/m².    Physical Exam  Vitals reviewed.   Constitutional:       General: She is not in acute distress.     Appearance: Normal appearance.   HENT:      Head: Normocephalic and atraumatic.      Right Ear: External ear normal.      Left Ear: External ear normal.   Eyes:      Extraocular Movements: Extraocular movements intact.      Pupils: Pupils are equal, round, and reactive to light.   Cardiovascular:      Rate and Rhythm: Normal rate and regular rhythm.      Pulses: Normal pulses.      Heart sounds: Normal heart sounds. No murmur heard.  Pulmonary:      Effort: Pulmonary effort is normal. No respiratory distress.      Breath sounds: Normal breath sounds. No wheezing.   Chest:      Chest wall: No tenderness.   Abdominal:      General: Abdomen is flat.      Palpations: Abdomen is soft. There is no mass.      Tenderness: There is no abdominal tenderness. There is no right CVA tenderness or left CVA tenderness.   Musculoskeletal:         General: No swelling or tenderness. Normal range of motion.   Skin:     General: Skin is warm and dry.      Capillary Refill: Capillary refill takes less  than 2 seconds.   Neurological:      General: No focal deficit present.      Mental Status: She is alert and oriented to person, place, and time. Mental status is at baseline.   Psychiatric:         Mood and Affect: Mood normal.         Thought Content: Thought content normal.     Cranial nerves 2-12 were grossly intact     Significant Labs: All pertinent labs within the past 24 hours have been reviewed.

## 2022-11-12 NOTE — ED PROVIDER NOTES
Encounter Date: 11/11/2022    SCRIBE #1 NOTE: I, Venice Pitt, am scribing for, and in the presence of,  Neftali Antoien MD. I have scribed the entire note.     History     Chief Complaint   Patient presents with    Loss of Vision     This is a 36 y/o female,who presents to the ED with complaints of left eye pain which started 5 days ago. She states she noticed blurred vision 4 days ago as well. Pt states she noticed she was unable to see completely out of the left eye as well. She reports she was seen at an eye doctor and was told she would need an MRI, which was preformed earlier today. She notes she was told to come to the ED for a 3 day admission. There are no other complaints/pain in the ED at this time.     The history is provided by the patient. No  was used.   Review of patient's allergies indicates:   Allergen Reactions    Paroxetine Anxiety     Past Medical History:   Diagnosis Date    Depression     Follicular cancer of thyroid      Past Surgical History:   Procedure Laterality Date    THYROIDECTOMY, BILATERAL Bilateral      History reviewed. No pertinent family history.  Social History     Tobacco Use    Smoking status: Never    Smokeless tobacco: Never     Review of Systems   Eyes:  Positive for pain (Left eye pain.).        Vision loss in the left eye.    All other systems reviewed and are negative.    Physical Exam     Initial Vitals [11/11/22 1737]   BP Pulse Resp Temp SpO2   138/84 88 18 99.5 °F (37.5 °C) 99 %      MAP       --         Physical Exam    Nursing note and vitals reviewed.  Constitutional: She appears well-developed and well-nourished.   HENT:   Head: Normocephalic and atraumatic.   Eyes: EOM are normal. Pupils are equal, round, and reactive to light.   Neck: Neck supple. No thyromegaly present.   Normal range of motion.  Cardiovascular:  Normal rate, regular rhythm, normal heart sounds and intact distal pulses.           No murmur heard.  Pulmonary/Chest:  Breath sounds normal. She has no wheezes.   Abdominal: Abdomen is soft. Bowel sounds are normal. There is no abdominal tenderness.   Musculoskeletal:         General: No tenderness or edema. Normal range of motion.      Cervical back: Normal range of motion and neck supple.     Lymphadenopathy:     She has no cervical adenopathy.   Neurological: She is alert and oriented to person, place, and time. She has normal strength and normal reflexes. No cranial nerve deficit or sensory deficit. GCS score is 15. GCS eye subscore is 4. GCS verbal subscore is 5. GCS motor subscore is 6.   Skin: Skin is warm and dry. Capillary refill takes less than 2 seconds. No rash noted.   Psychiatric: She has a normal mood and affect.       ED Course   Procedures  Labs Reviewed   COMPREHENSIVE METABOLIC PANEL - Abnormal; Notable for the following components:       Result Value    Creatinine 1.20 (*)     Albumin 3.1 (*)     All other components within normal limits   SEDIMENTATION RATE, AUTOMATED - Abnormal; Notable for the following components:    ESR Westergren 21 (*)     All other components within normal limits   C-REACTIVE PROTEIN - Abnormal; Notable for the following components:    CRP 1.86 (*)     All other components within normal limits   CBC W/ AUTO DIFFERENTIAL    Narrative:     The following orders were created for panel order CBC auto differential.  Procedure                               Abnormality         Status                     ---------                               -----------         ------                     CBC with Differential[[id number]]                            Final result                 Please view results for these tests on the individual orders.   CBC WITH DIFFERENTIAL          Imaging Results    None          Medications   buPROPion TBSR 12 hr tablet 150 mg (150 mg Oral Given 11/12/22 0912)   citalopram tablet 40 mg (40 mg Oral Given 11/12/22 5972)   magnesium oxide tablet 400 mg (400 mg Oral Given  11/12/22 2120)   Liothyronine 2.5 mcg (2.5 mcg Oral Given 11/12/22 2121)   HYDROcodone-acetaminophen 7.5-325 mg per tablet 1 tablet (1 tablet Oral Given 11/12/22 2322)   methylPREDNISolone sodium succinate injection 250 mg (250 mg Intravenous Given 11/12/22 2314)   sodium chloride 0.9% flush 10 mL (has no administration in time range)   naloxone 0.4 mg/mL injection 0.02 mg (has no administration in time range)   ondansetron injection 4 mg (has no administration in time range)   trazodone split tablet 25 mg (25 mg Oral Given 11/13/22 0010)   methylPREDNISolone sodium succinate injection 250 mg (250 mg Intravenous Given 11/11/22 1836)   methylPREDNISolone sodium succinate injection 250 mg (250 mg Intravenous Given 11/12/22 0008)     Medical Decision Making:   ED Management:  I discussed the case with Dr. Ambrose the hospitalist.  I informed him that ophthalmology has send the patient to our department in anticipation for admission.  Other:   I have discussed this case with another health care provider.          Attending Attestation:           Physician Attestation for Scribe:  Physician Attestation Statement for Scribe #1: I, Neftali Antoine MD, reviewed documentation, as scribed by Venice Pitt in my presence, and it is both accurate and complete.                        Clinical Impression:   Final diagnoses:  [H46.9] Optic neuritis (Primary)      ED Disposition Condition    Admit Stable                Neftali Antoine MD  11/13/22 0013

## 2022-11-12 NOTE — HPI
Patient is a 37-year-old morbidly obese female with a history of follicular carcinoma of thyroid status post right lobectomy currently on Cytomel in preparation for GONZALEZ who presented to the emergency room after having been seen by retinal specialist  earlier today for left eye pain and blindness for the past 2 days. Retinal examination along with MRI was suggestive of a presence of optic neuritis and after consultation with , a neurologist, a decision was made to admit this patient to this hospital for high-dose steroid therapy.  Patient will be started on Solu-Medrol 250 mg IV q.6 hours times 3 days.

## 2022-11-12 NOTE — H&P
[facility] - Emergency Department  Hospital Medicine  History & Physical    Patient Name: Jeanette Houston  MRN: [id number]  Patient Class: Emergency  Admission Date: 11/11/2022  Attending Physician: CHANELLE Shearer MD  Primary Care Provider: rKistyn Flood MD         Patient information was obtained from patient and ER records.     Subjective:     Principal Problem:Optic neuritis    Chief Complaint:   Chief Complaint   Patient presents with    Loss of Vision        HPI: Patient is a 37-year-old morbidly obese female with a history of follicular carcinoma of thyroid status post right lobectomy currently on Cytomel in preparation for GONZALEZ who presented to the emergency room after having been seen by retinal specialist  earlier today for left eye pain and blindness for the past 2 days. Retinal examination along with MRI was suggestive of a presence of optic neuritis and after consultation with , a neurologist, a decision was made to admit this patient to this hospital for high-dose steroid therapy.  Patient will be started on Solu-Medrol 250 mg IV q.6 hours times 3 days.      No new subjective & objective note has been filed under this hospital service since the last note was generated.    Assessment/Plan:     * Optic neuritis    Patient had a 5 day history L eye pain on performing extraocular movements and a 2 day history of complete blindness involving L eye.  Patient was subsequently seen by a retinal specialist , and after MRI, patient was diagnosed with optic neuritis.   recommended an admission with high dose steroid regimen for a period of 3 days.  Literature suggests that optic neuritis can sometimes be associated with multiple sclerosis, but patient does not have any symptoms suggestive of multiple sclerosis at this time.  Patient is to follow-up with Dr. Elise and Dr. Noel after discharge        Follicular cancer of thyroid    Patient had resection of left lobe of  thyroid in 2014 for multinodular goiter with symptoms associated with mass effect, but it was benign.  However, when she had resection of right lobe of thyroid in 2022, patient was found to have follicular cancer of thyroid.  Patient is currently being treated with Cytomel in preparation for GONZALEZ.  Patient is in septic follow-up with Dr. Gray at [facility] in [facility]      Morbid obesity    Body mass index is 48.94 kg/m². Morbid obesity complicates all aspects of disease management from diagnostic modalities to treatment. Weight loss encouraged and health benefits explained to patient.         Depression    Adequately controlled on current regimen continue present management          VTE Risk Mitigation (From admission, onward)    None             Bib Ambrose MD  Department of Hospital Medicine   [facility] - Emergency Department

## 2022-11-12 NOTE — ASSESSMENT & PLAN NOTE
Patient had resection of left lobe of thyroid in 2014 for multinodular goiter with symptoms associated with mass effect, but it was benign.  However, when she had resection of right lobe of thyroid in 2022, patient was found to have follicular cancer of thyroid.  Patient is currently being treated with Cytomel in preparation for GONZALEZ.  Patient is in septic follow-up with Dr. Gray at [facility] in [facility]

## 2022-11-12 NOTE — ASSESSMENT & PLAN NOTE
Patient had a 5 day history L eye pain on performing extraocular movements and a 2 day history of complete blindness involving L eye.  Patient was subsequently seen by a retinal specialist , and after MRI, patient was diagnosed with optic neuritis.   recommended an admission with high dose steroid regimen for a period of 3 days.  Literature suggests that optic neuritis can sometimes be associated with multiple sclerosis, but patient does not have any symptoms suggestive of multiple sclerosis at this time.  Patient is to follow-up with Dr. Elise and Dr. Noel after discharge

## 2022-11-13 LAB
AORTIC ROOT ANNULUS: 2.2 CM
AORTIC VALVE CUSP SEPERATION: 1.9 CM
AV INDEX (PROSTH): 1.08
AV MEAN GRADIENT: 1 MMHG
AV PEAK GRADIENT: 1 MMHG
AV VALVE AREA: 3.07 CM2
AV VELOCITY RATIO: 1.17
BSA FOR ECHO PROCEDURE: 2.61 M2
CV ECHO LV RWT: 0.5 CM
DOP CALC AO PEAK VEL: 0.6 M/S
DOP CALC AO VTI: 12 CM
DOP CALC LVOT AREA: 2.8 CM2
DOP CALC LVOT DIAMETER: 1.9 CM
DOP CALC LVOT PEAK VEL: 0.7 M/S
DOP CALC LVOT STROKE VOLUME: 36.84 CM3
DOP CALCLVOT PEAK VEL VTI: 13 CM
E WAVE DECELERATION TIME: 175 MSEC
ECHO EF ESTIMATED: 55 %
ECHO LV POSTERIOR WALL: 1.02 CM (ref 0.6–1.1)
EJECTION FRACTION: 55 %
FRACTIONAL SHORTENING: 30 % (ref 28–44)
INTERVENTRICULAR SEPTUM: 1.1 CM (ref 0.6–1.1)
IVC OSTIUM: 1 CM
LEFT ATRIUM SIZE: 2.5 CM
LEFT INTERNAL DIMENSION IN SYSTOLE: 2.85 CM (ref 2.1–4)
LEFT VENTRICLE DIASTOLIC VOLUME INDEX: 29.51 ML/M2
LEFT VENTRICLE DIASTOLIC VOLUME: 72.9 ML
LEFT VENTRICLE MASS INDEX: 57 G/M2
LEFT VENTRICLE SYSTOLIC VOLUME INDEX: 12.5 ML/M2
LEFT VENTRICLE SYSTOLIC VOLUME: 30.9 ML
LEFT VENTRICULAR INTERNAL DIMENSION IN DIASTOLE: 4.07 CM (ref 3.5–6)
LEFT VENTRICULAR MASS: 141.84 G
LVOT MG: 1 MMHG
MV PEAK E VEL: 0.56 M/S
PISA TR MAX VEL: 2.3 M/S
RA MAJOR: 2.8 CM
RA PRESSURE: 3 MMHG
RIGHT VENTRICULAR END-DIASTOLIC DIMENSION: 2.5 CM
TR MAX PG: 21 MMHG
TRICUSPID ANNULAR PLANE SYSTOLIC EXCURSION: 2 CM
TV REST PULMONARY ARTERY PRESSURE: 24 MMHG

## 2022-11-13 PROCEDURE — 11000001 HC ACUTE MED/SURG PRIVATE ROOM

## 2022-11-13 PROCEDURE — 96376 TX/PRO/DX INJ SAME DRUG ADON: CPT | Mod: 59

## 2022-11-13 PROCEDURE — 63600175 PHARM REV CODE 636 W HCPCS: Performed by: INTERNAL MEDICINE

## 2022-11-13 PROCEDURE — 99215 OFFICE O/P EST HI 40 MIN: CPT | Mod: ,,, | Performed by: HOSPITALIST

## 2022-11-13 PROCEDURE — G0378 HOSPITAL OBSERVATION PER HR: HCPCS

## 2022-11-13 PROCEDURE — 99215 PR OFFICE/OUTPT VISIT, EST, LEVL V, 40-54 MIN: ICD-10-PCS | Mod: ,,, | Performed by: HOSPITALIST

## 2022-11-13 PROCEDURE — 25000003 PHARM REV CODE 250: Performed by: INTERNAL MEDICINE

## 2022-11-13 RX ADMIN — CITALOPRAM HYDROBROMIDE 40 MG: 20 TABLET ORAL at 05:11

## 2022-11-13 RX ADMIN — Medication 400 MG: at 09:11

## 2022-11-13 RX ADMIN — Medication 400 MG: at 08:11

## 2022-11-13 RX ADMIN — TRAZODONE HYDROCHLORIDE 25 MG: 50 TABLET ORAL at 12:11

## 2022-11-13 RX ADMIN — METHYLPREDNISOLONE SODIUM SUCCINATE 250 MG: 125 INJECTION, POWDER, FOR SOLUTION INTRAMUSCULAR; INTRAVENOUS at 06:11

## 2022-11-13 RX ADMIN — METHYLPREDNISOLONE SODIUM SUCCINATE 250 MG: 125 INJECTION, POWDER, FOR SOLUTION INTRAMUSCULAR; INTRAVENOUS at 05:11

## 2022-11-13 RX ADMIN — METHYLPREDNISOLONE SODIUM SUCCINATE 250 MG: 125 INJECTION, POWDER, FOR SOLUTION INTRAMUSCULAR; INTRAVENOUS at 12:11

## 2022-11-13 RX ADMIN — BUPROPION HYDROCHLORIDE 150 MG: 150 TABLET, EXTENDED RELEASE ORAL at 09:11

## 2022-11-13 NOTE — SUBJECTIVE & OBJECTIVE
Interval History:     Review of Systems   Constitutional:  Negative for chills and fever.   HENT:  Negative for postnasal drip and rhinorrhea.    Eyes:  Positive for pain and visual disturbance. Negative for itching.   Respiratory:  Negative for shortness of breath.    Cardiovascular:  Negative for chest pain, palpitations and leg swelling.   Gastrointestinal:  Negative for abdominal distention, abdominal pain, diarrhea, nausea and vomiting.   Endocrine: Negative for cold intolerance, heat intolerance, polydipsia, polyphagia and polyuria.   Genitourinary:  Negative for dysuria and hematuria.   Musculoskeletal:  Negative for arthralgias, joint swelling, myalgias, neck pain and neck stiffness.   Skin:  Negative for pallor and rash.   Allergic/Immunologic: Negative for environmental allergies, food allergies and immunocompromised state.   Neurological:  Negative for dizziness, seizures, facial asymmetry, light-headedness and numbness.   Objective:     Vital Signs (Most Recent):  Temp: 98.3 °F (36.8 °C) (11/12/22 1908)  Pulse: 75 (11/12/22 1908)  Resp: 20 (11/12/22 1908)  BP: (!) 142/62 (11/12/22 1908)  SpO2: 100 % (11/12/22 1908)   Vital Signs (24h Range):  Temp:  [97.6 °F (36.4 °C)-98.3 °F (36.8 °C)] 98.3 °F (36.8 °C)  Pulse:  [64-96] 75  Resp:  [16-20] 20  SpO2:  [96 %-100 %] 100 %  BP: (113-174)/(57-85) 142/62     Weight: (!) 141.5 kg (312 lb)  Body mass index is 47.44 kg/m².  No intake or output data in the 24 hours ending 11/12/22 1948   Physical Exam  Vitals reviewed.   Constitutional:       General: She is not in acute distress.     Appearance: Normal appearance.   HENT:      Head: Normocephalic and atraumatic.      Right Ear: External ear normal.      Left Ear: External ear normal.   Eyes:      Extraocular Movements: Extraocular movements intact.      Pupils: Pupils are equal, round, and reactive to light.   Cardiovascular:      Rate and Rhythm: Normal rate and regular rhythm.      Pulses: Normal pulses.       Heart sounds: Normal heart sounds. No murmur heard.  Pulmonary:      Effort: Pulmonary effort is normal. No respiratory distress.      Breath sounds: Normal breath sounds. No wheezing.   Chest:      Chest wall: No tenderness.   Abdominal:      General: Abdomen is flat.      Palpations: Abdomen is soft. There is no mass.      Tenderness: There is no abdominal tenderness. There is no right CVA tenderness or left CVA tenderness.   Musculoskeletal:         General: No swelling or tenderness. Normal range of motion.   Skin:     General: Skin is warm and dry.      Capillary Refill: Capillary refill takes less than 2 seconds.   Neurological:      General: No focal deficit present.      Mental Status: She is alert and oriented to person, place, and time. Mental status is at baseline.   Psychiatric:         Mood and Affect: Mood normal.         Thought Content: Thought content normal.       Significant Labs: All pertinent labs within the past 24 hours have been reviewed.    Significant Imaging: I have reviewed all pertinent imaging results/findings within the past 24 hours.

## 2022-11-13 NOTE — H&P
[facility] Hospital Medicine  History & Physical    Patient Name: Jeanette Houston  MRN: [id number]  Patient Class: IP- Inpatient  Admission Date: 11/11/2022  Attending Physician: CHANELLE Shearer MD  Primary Care Provider: Kristyn Flood MD         Patient information was obtained from patient and ER records.     Subjective:     Principal Problem:Optic neuritis    Chief Complaint:   Chief Complaint   Patient presents with    Loss of Vision        HPI: Patient is a 37-year-old morbidly obese female with a history of follicular carcinoma of thyroid status post right lobectomy currently on Cytomel in preparation for GONZALEZ who presented to the emergency room after having been seen by retinal specialist  earlier today for left eye pain and blindness for the past 2 days. Retinal examination along with MRI was suggestive of a presence of optic neuritis and after consultation with , a neurologist, a decision was made to admit this patient to this hospital for high-dose steroid therapy.  Patient will be started on Solu-Medrol 250 mg IV q.6 hours times 3 days.      Past Medical History:   Diagnosis Date    Depression     Follicular cancer of thyroid        Past Surgical History:   Procedure Laterality Date    THYROIDECTOMY, BILATERAL Bilateral        Review of patient's allergies indicates:   Allergen Reactions    Paroxetine Anxiety       No current facility-administered medications on file prior to encounter.     Current Outpatient Medications on File Prior to Encounter   Medication Sig    buPROPion (WELLBUTRIN SR) 150 MG TBSR 12 hr tablet Wellbutrin  MG Oral Tablet Extended Release 12 Hour QTY: 90 tablet Days: 90 Refills: 1  Written: 07/04/22 Patient Instructions: once a day    cholecalciferol, vitamin D3, (VITAMIN D3) 250 mcg (10,000 unit) Cap capsule Vitamin D3 250 MCG (77827 UT) Oral Capsule QTY: 90 capsule Days: 90 Refills: 3  Written: 04/18/22 Patient Instructions: once a  day    citalopram (CELEXA) 40 MG tablet Take 40 mg by mouth once daily.    ibuprofen (ADVIL,MOTRIN) 800 MG tablet Take 1 tablet (800 mg total) by mouth every 6 (six) hours as needed for Pain.    levothyroxine (SYNTHROID) 100 MCG tablet Take 100 mcg by mouth before breakfast.    magnesium oxide (MAG-OX) 400 mg (241.3 mg magnesium) tablet Take 1 tablet by mouth 2 (two) times daily.     Family History    None       Tobacco Use    Smoking status: Never    Smokeless tobacco: Never   Substance and Sexual Activity    Alcohol use: Not on file    Drug use: Not on file    Sexual activity: Not on file     Review of Systems   Constitutional:  Negative for chills and fever.   HENT:  Negative for postnasal drip and rhinorrhea.    Eyes:  Positive for pain and visual disturbance. Negative for itching.   Respiratory:  Negative for shortness of breath.    Cardiovascular:  Negative for chest pain, palpitations and leg swelling.   Gastrointestinal:  Negative for abdominal distention, abdominal pain, diarrhea, nausea and vomiting.   Endocrine: Negative for cold intolerance, heat intolerance, polydipsia, polyphagia and polyuria.   Genitourinary:  Negative for dysuria and hematuria.   Musculoskeletal:  Negative for arthralgias, joint swelling, myalgias, neck pain and neck stiffness.   Skin:  Negative for pallor and rash.   Allergic/Immunologic: Negative for environmental allergies, food allergies and immunocompromised state.   Neurological:  Negative for dizziness, seizures, facial asymmetry, light-headedness and numbness.   Objective:     Vital Signs (Most Recent):  Temp: 99.5 °F (37.5 °C) (11/11/22 1737)  Pulse: 82 (11/11/22 2227)  Resp: 18 (11/11/22 1737)  BP: (!) 149/77 (11/11/22 2227)  SpO2: 99 % (11/11/22 2227)   Vital Signs (24h Range):  Temp:  [99.5 °F (37.5 °C)] 99.5 °F (37.5 °C)  Pulse:  [80-89] 82  Resp:  [18] 18  SpO2:  [98 %-100 %] 99 %  BP: (124-174)/(63-85) 149/77     Weight: (!) 141.7 kg (312 lb 8 oz)  Body mass  index is 48.94 kg/m².    Physical Exam  Vitals reviewed.   Constitutional:       General: She is not in acute distress.     Appearance: Normal appearance.   HENT:      Head: Normocephalic and atraumatic.      Right Ear: External ear normal.      Left Ear: External ear normal.   Eyes:      Extraocular Movements: Extraocular movements intact.      Pupils: Pupils are equal, round, and reactive to light.   Cardiovascular:      Rate and Rhythm: Normal rate and regular rhythm.      Pulses: Normal pulses.      Heart sounds: Normal heart sounds. No murmur heard.  Pulmonary:      Effort: Pulmonary effort is normal. No respiratory distress.      Breath sounds: Normal breath sounds. No wheezing.   Chest:      Chest wall: No tenderness.   Abdominal:      General: Abdomen is flat.      Palpations: Abdomen is soft. There is no mass.      Tenderness: There is no abdominal tenderness. There is no right CVA tenderness or left CVA tenderness.   Musculoskeletal:         General: No swelling or tenderness. Normal range of motion.   Skin:     General: Skin is warm and dry.      Capillary Refill: Capillary refill takes less than 2 seconds.   Neurological:      General: No focal deficit present.      Mental Status: She is alert and oriented to person, place, and time. Mental status is at baseline.   Psychiatric:         Mood and Affect: Mood normal.         Thought Content: Thought content normal.     Cranial nerves 2-12 were grossly intact     Significant Labs: All pertinent labs within the past 24 hours have been reviewed.      Assessment/Plan:     * Optic neuritis    Patient had a 5 day history L eye pain on performing extraocular movements and a 2 day history of complete blindness involving L eye.  Patient was subsequently seen by a retinal specialist , and after MRI, patient was diagnosed with optic neuritis.   recommended an admission with high dose steroid regimen for a period of 3 days.  Literature suggests that  optic neuritis can sometimes be associated with multiple sclerosis, but patient does not have any symptoms suggestive of multiple sclerosis at this time.  Patient is to follow-up with Dr. Elise and Dr. Noel after discharge        Follicular cancer of thyroid    Patient had resection of left lobe of thyroid in 2014 for multinodular goiter with symptoms associated with mass effect, but it was benign.  However, when she had resection of right lobe of thyroid in 2022, patient was found to have follicular cancer of thyroid.  Patient is currently being treated with Cytomel in preparation for GONZALEZ.  Patient is in septic follow-up with Dr. Gray at [facility] in [facility]      Morbid obesity    Body mass index is 48.94 kg/m². Morbid obesity complicates all aspects of disease management from diagnostic modalities to treatment. Weight loss encouraged and health benefits explained to patient.         Depression    Adequately controlled on current regimen continue present management          VTE Risk Mitigation (From admission, onward)         Ordered     IP VTE HIGH RISK PATIENT  Once         11/12/22 0105     Place sequential compression device  Until discontinued         11/12/22 0105                   Bib Ambrose MD  Department of Hospital Medicine   [facility]

## 2022-11-13 NOTE — PROGRESS NOTES
[facility] Medicine  Progress Note    Patient Name: Jeanette Houston  MRN: [id number]  Patient Class: IP- Inpatient   Admission Date: 11/11/2022  Length of Stay: 1 days  Attending Physician: Sera Shearer MD  Primary Care Provider: Kristyn Flood MD        Subjective:     Principal Problem:Optic neuritis    HPI:  Patient is a 37-year-old morbidly obese female with a history of follicular carcinoma of thyroid status post right lobectomy currently on Cytomel in preparation for GONZALEZ who presented to the emergency room after having been seen by retinal specialist  earlier today for left eye pain and blindness for the past 2 days. Retinal examination along with MRI was suggestive of a presence of optic neuritis and after consultation with , a neurologist, a decision was made to admit this patient to this hospital for high-dose steroid therapy.  Patient will be started on Solu-Medrol 250 mg IV q.6 hours times 3 days.      Overview/Hospital Course:  11/12:  Patient reports some vision improvement with IV steroids.  Will continue the course per recommendation of Neurology.    She will need close follow-up with Neurology.  Given the small chance of a possible stroke I will order echo and carotid Dopplers.    11/13:  Vision continues to improve.  Carotid Doppler and echo were relatively normal except increased PA pressure which may be related to sleep apnea this patient with obesity.      Interval History:   Review of Systems   Constitutional:  Negative for chills and fever.   HENT:  Negative for postnasal drip and rhinorrhea.    Eyes:  Positive for pain and visual disturbance. Negative for itching.   Respiratory:  Negative for shortness of breath.    Cardiovascular:  Negative for chest pain, palpitations and leg swelling.   Gastrointestinal:  Negative for abdominal distention, abdominal pain, diarrhea, nausea and vomiting.   Endocrine: Negative for cold intolerance,  heat intolerance, polydipsia, polyphagia and polyuria.   Genitourinary:  Negative for dysuria and hematuria.   Musculoskeletal:  Negative for arthralgias, joint swelling, myalgias, neck pain and neck stiffness.   Skin:  Negative for pallor and rash.   Allergic/Immunologic: Negative for environmental allergies, food allergies and immunocompromised state.   Neurological:  Negative for dizziness, seizures, facial asymmetry, light-headedness and numbness.   Objective:     Vital Signs (Most Recent):  Temp: 98.5 °F (36.9 °C) (11/13/22 1137)  Pulse: 71 (11/13/22 1137)  Resp: 20 (11/13/22 1137)  BP: 120/68 (11/13/22 1137)  SpO2: 95 % (11/13/22 1137) Vital Signs (24h Range):  Temp:  [97.9 °F (36.6 °C)-98.6 °F (37 °C)] 98.5 °F (36.9 °C)  Pulse:  [66-96] 71  Resp:  [18-20] 20  SpO2:  [95 %-100 %] 95 %  BP: (113-142)/(50-79) 120/68     Weight: (!) 141.5 kg (312 lb)  Body mass index is 47.44 kg/m².  No intake or output data in the 24 hours ending 11/13/22 1336   Physical Exam  Vitals reviewed.   Constitutional:       General: She is not in acute distress.     Appearance: Normal appearance.   HENT:      Head: Normocephalic and atraumatic.      Right Ear: External ear normal.      Left Ear: External ear normal.   Eyes:      Extraocular Movements: Extraocular movements intact.      Pupils: Pupils are equal, round, and reactive to light.   Cardiovascular:      Rate and Rhythm: Normal rate and regular rhythm.      Pulses: Normal pulses.      Heart sounds: Normal heart sounds. No murmur heard.  Pulmonary:      Effort: Pulmonary effort is normal. No respiratory distress.      Breath sounds: Normal breath sounds. No wheezing.   Chest:      Chest wall: No tenderness.   Abdominal:      General: Abdomen is flat.      Palpations: Abdomen is soft. There is no mass.      Tenderness: There is no abdominal tenderness. There is no right CVA tenderness or left CVA tenderness.   Musculoskeletal:         General: No swelling or tenderness. Normal  range of motion.   Skin:     General: Skin is warm and dry.      Capillary Refill: Capillary refill takes less than 2 seconds.   Neurological:      General: No focal deficit present.      Mental Status: She is alert and oriented to person, place, and time. Mental status is at baseline.   Psychiatric:         Mood and Affect: Mood normal.         Thought Content: Thought content normal.       Significant Labs: All pertinent labs within the past 24 hours have been reviewed.    Significant Imaging: I have reviewed all pertinent imaging results/findings within the past 24 hours.      Assessment/Plan:      * Optic neuritis    Patient had a 5 day history L eye pain on performing extraocular movements and a 2 day history of complete blindness involving L eye.  Patient was subsequently seen by a retinal specialist , and after MRI, patient was diagnosed with optic neuritis.   recommended an admission with high dose steroid regimen for a period of 3 days.  Literature suggests that optic neuritis can sometimes be associated with multiple sclerosis, but patient does not have any symptoms suggestive of multiple sclerosis at this time.  Patient is to follow-up with Dr. Elise and Dr. Noel after discharge    11/12:  Most likely optic neuritis and there is some improvement with IV steroids.  CVA is a less likely explanation but I ordered echo and carotid Doppler.    11/13:  Patient requires urgent follow-up with neurologist.  Will not prescribe p.o. steroids on discharge.  Patient instructed to go to the nearest emergency department if she loses her vision again.        Depression    Adequately controlled on current regimen continue present management        Class 3 obesity    Body mass index is 48.94 kg/m². Morbid obesity complicates all aspects of disease management from diagnostic modalities to treatment. Weight loss encouraged and health benefits explained to patient.         Follicular cancer of  thyroid    Patient had resection of left lobe of thyroid in 2014 for multinodular goiter with symptoms associated with mass effect, but it was benign.  However, when she had resection of right lobe of thyroid in 2022, patient was found to have follicular cancer of thyroid.  Patient is currently being treated with Cytomel in preparation for GONZALEZ.  Patient is in septic follow-up with Dr. Gray at [facility] in [facility]        VTE Risk Mitigation (From admission, onward)         Ordered     IP VTE HIGH RISK PATIENT  Once         11/12/22 0105     Place sequential compression device  Until discontinued         11/12/22 0105                Discharge Planning   MALU:      Code Status: Full Code   Is the patient medically ready for discharge?:     Reason for patient still in hospital (select all that apply): Treatment                     Sera Shearer MD  Department of Hospital Medicine   [facility]

## 2022-11-13 NOTE — PROGRESS NOTES
[facility] Medicine  Progress Note    Patient Name: Jeanette Houston  MRN: [id number]  Patient Class: IP- Inpatient   Admission Date: 11/11/2022  Length of Stay: 0 days  Attending Physician: Sera Shearer MD  Primary Care Provider: Kristyn Flood MD        Subjective:     Principal Problem:Optic neuritis        HPI:  Patient is a 37-year-old morbidly obese female with a history of follicular carcinoma of thyroid status post right lobectomy currently on Cytomel in preparation for GONZALEZ who presented to the emergency room after having been seen by retinal specialist  earlier today for left eye pain and blindness for the past 2 days. Retinal examination along with MRI was suggestive of a presence of optic neuritis and after consultation with , a neurologist, a decision was made to admit this patient to this hospital for high-dose steroid therapy.  Patient will be started on Solu-Medrol 250 mg IV q.6 hours times 3 days.      Overview/Hospital Course:  11/12:  Patient reports some vision improvement with IV steroids.  Will continue the course per recommendation of Neurology.    She will need close follow-up with Neurology.  Given the small chance of a possible stroke I will order echo and carotid Dopplers.      Interval History:     Review of Systems   Constitutional:  Negative for chills and fever.   HENT:  Negative for postnasal drip and rhinorrhea.    Eyes:  Positive for pain and visual disturbance. Negative for itching.   Respiratory:  Negative for shortness of breath.    Cardiovascular:  Negative for chest pain, palpitations and leg swelling.   Gastrointestinal:  Negative for abdominal distention, abdominal pain, diarrhea, nausea and vomiting.   Endocrine: Negative for cold intolerance, heat intolerance, polydipsia, polyphagia and polyuria.   Genitourinary:  Negative for dysuria and hematuria.   Musculoskeletal:  Negative for arthralgias, joint swelling,  myalgias, neck pain and neck stiffness.   Skin:  Negative for pallor and rash.   Allergic/Immunologic: Negative for environmental allergies, food allergies and immunocompromised state.   Neurological:  Negative for dizziness, seizures, facial asymmetry, light-headedness and numbness.   Objective:     Vital Signs (Most Recent):  Temp: 98.3 °F (36.8 °C) (11/12/22 1908)  Pulse: 75 (11/12/22 1908)  Resp: 20 (11/12/22 1908)  BP: (!) 142/62 (11/12/22 1908)  SpO2: 100 % (11/12/22 1908)   Vital Signs (24h Range):  Temp:  [97.6 °F (36.4 °C)-98.3 °F (36.8 °C)] 98.3 °F (36.8 °C)  Pulse:  [64-96] 75  Resp:  [16-20] 20  SpO2:  [96 %-100 %] 100 %  BP: (113-174)/(57-85) 142/62     Weight: (!) 141.5 kg (312 lb)  Body mass index is 47.44 kg/m².  No intake or output data in the 24 hours ending 11/12/22 1948   Physical Exam  Vitals reviewed.   Constitutional:       General: She is not in acute distress.     Appearance: Normal appearance.   HENT:      Head: Normocephalic and atraumatic.      Right Ear: External ear normal.      Left Ear: External ear normal.   Eyes:      Extraocular Movements: Extraocular movements intact.      Pupils: Pupils are equal, round, and reactive to light.   Cardiovascular:      Rate and Rhythm: Normal rate and regular rhythm.      Pulses: Normal pulses.      Heart sounds: Normal heart sounds. No murmur heard.  Pulmonary:      Effort: Pulmonary effort is normal. No respiratory distress.      Breath sounds: Normal breath sounds. No wheezing.   Chest:      Chest wall: No tenderness.   Abdominal:      General: Abdomen is flat.      Palpations: Abdomen is soft. There is no mass.      Tenderness: There is no abdominal tenderness. There is no right CVA tenderness or left CVA tenderness.   Musculoskeletal:         General: No swelling or tenderness. Normal range of motion.   Skin:     General: Skin is warm and dry.      Capillary Refill: Capillary refill takes less than 2 seconds.   Neurological:      General: No  focal deficit present.      Mental Status: She is alert and oriented to person, place, and time. Mental status is at baseline.   Psychiatric:         Mood and Affect: Mood normal.         Thought Content: Thought content normal.       Significant Labs: All pertinent labs within the past 24 hours have been reviewed.    Significant Imaging: I have reviewed all pertinent imaging results/findings within the past 24 hours.      Assessment/Plan:      * Optic neuritis    Patient had a 5 day history L eye pain on performing extraocular movements and a 2 day history of complete blindness involving L eye.  Patient was subsequently seen by a retinal specialist , and after MRI, patient was diagnosed with optic neuritis.   recommended an admission with high dose steroid regimen for a period of 3 days.  Literature suggests that optic neuritis can sometimes be associated with multiple sclerosis, but patient does not have any symptoms suggestive of multiple sclerosis at this time.  Patient is to follow-up with Dr. Elise and Dr. Noel after discharge    11/12:  Most likely optic neuritis and there is some improvement with IV steroids.  CVA is a less likely explanation but I ordered echo and carotid Doppler.        Depression    Adequately controlled on current regimen continue present management        Morbid obesity    Body mass index is 48.94 kg/m². Morbid obesity complicates all aspects of disease management from diagnostic modalities to treatment. Weight loss encouraged and health benefits explained to patient.         Follicular cancer of thyroid    Patient had resection of left lobe of thyroid in 2014 for multinodular goiter with symptoms associated with mass effect, but it was benign.  However, when she had resection of right lobe of thyroid in 2022, patient was found to have follicular cancer of thyroid.  Patient is currently being treated with Cytomel in preparation for GONZALEZ.  Patient is in septic  follow-up with Dr. Gray at [facility] in [facility]        VTE Risk Mitigation (From admission, onward)         Ordered     IP VTE HIGH RISK PATIENT  Once         11/12/22 0105     Place sequential compression device  Until discontinued         11/12/22 0105                Discharge Planning   MALU:      Code Status: Full Code   Is the patient medically ready for discharge?:     Reason for patient still in hospital (select all that apply): Treatment                     Sera Shearer MD  Department of Hospital Medicine   [facility]

## 2022-11-13 NOTE — ASSESSMENT & PLAN NOTE
Patient had a 5 day history L eye pain on performing extraocular movements and a 2 day history of complete blindness involving L eye.  Patient was subsequently seen by a retinal specialist , and after MRI, patient was diagnosed with optic neuritis.   recommended an admission with high dose steroid regimen for a period of 3 days.  Literature suggests that optic neuritis can sometimes be associated with multiple sclerosis, but patient does not have any symptoms suggestive of multiple sclerosis at this time.  Patient is to follow-up with Dr. Elise and Dr. Noel after discharge    11/12:  Most likely optic neuritis and there is some improvement with IV steroids.  CVA is a less likely explanation but I ordered echo and carotid Doppler.

## 2022-11-13 NOTE — ASSESSMENT & PLAN NOTE
Patient had a 5 day history L eye pain on performing extraocular movements and a 2 day history of complete blindness involving L eye.  Patient was subsequently seen by a retinal specialist , and after MRI, patient was diagnosed with optic neuritis.   recommended an admission with high dose steroid regimen for a period of 3 days.  Literature suggests that optic neuritis can sometimes be associated with multiple sclerosis, but patient does not have any symptoms suggestive of multiple sclerosis at this time.  Patient is to follow-up with Dr. Elise and Dr. Noel after discharge    11/12:  Most likely optic neuritis and there is some improvement with IV steroids.  CVA is a less likely explanation but I ordered echo and carotid Doppler.    11/13:  Patient requires urgent follow-up with neurologist.  Will not prescribe p.o. steroids on discharge.  Patient instructed to go to the nearest emergency department if she loses her vision again.

## 2022-11-13 NOTE — SUBJECTIVE & OBJECTIVE
Interval History:   Review of Systems   Constitutional:  Negative for chills and fever.   HENT:  Negative for postnasal drip and rhinorrhea.    Eyes:  Positive for pain and visual disturbance. Negative for itching.   Respiratory:  Negative for shortness of breath.    Cardiovascular:  Negative for chest pain, palpitations and leg swelling.   Gastrointestinal:  Negative for abdominal distention, abdominal pain, diarrhea, nausea and vomiting.   Endocrine: Negative for cold intolerance, heat intolerance, polydipsia, polyphagia and polyuria.   Genitourinary:  Negative for dysuria and hematuria.   Musculoskeletal:  Negative for arthralgias, joint swelling, myalgias, neck pain and neck stiffness.   Skin:  Negative for pallor and rash.   Allergic/Immunologic: Negative for environmental allergies, food allergies and immunocompromised state.   Neurological:  Negative for dizziness, seizures, facial asymmetry, light-headedness and numbness.   Objective:     Vital Signs (Most Recent):  Temp: 98.5 °F (36.9 °C) (11/13/22 1137)  Pulse: 71 (11/13/22 1137)  Resp: 20 (11/13/22 1137)  BP: 120/68 (11/13/22 1137)  SpO2: 95 % (11/13/22 1137) Vital Signs (24h Range):  Temp:  [97.9 °F (36.6 °C)-98.6 °F (37 °C)] 98.5 °F (36.9 °C)  Pulse:  [66-96] 71  Resp:  [18-20] 20  SpO2:  [95 %-100 %] 95 %  BP: (113-142)/(50-79) 120/68     Weight: (!) 141.5 kg (312 lb)  Body mass index is 47.44 kg/m².  No intake or output data in the 24 hours ending 11/13/22 1336   Physical Exam  Vitals reviewed.   Constitutional:       General: She is not in acute distress.     Appearance: Normal appearance.   HENT:      Head: Normocephalic and atraumatic.      Right Ear: External ear normal.      Left Ear: External ear normal.   Eyes:      Extraocular Movements: Extraocular movements intact.      Pupils: Pupils are equal, round, and reactive to light.   Cardiovascular:      Rate and Rhythm: Normal rate and regular rhythm.      Pulses: Normal pulses.      Heart sounds:  Normal heart sounds. No murmur heard.  Pulmonary:      Effort: Pulmonary effort is normal. No respiratory distress.      Breath sounds: Normal breath sounds. No wheezing.   Chest:      Chest wall: No tenderness.   Abdominal:      General: Abdomen is flat.      Palpations: Abdomen is soft. There is no mass.      Tenderness: There is no abdominal tenderness. There is no right CVA tenderness or left CVA tenderness.   Musculoskeletal:         General: No swelling or tenderness. Normal range of motion.   Skin:     General: Skin is warm and dry.      Capillary Refill: Capillary refill takes less than 2 seconds.   Neurological:      General: No focal deficit present.      Mental Status: She is alert and oriented to person, place, and time. Mental status is at baseline.   Psychiatric:         Mood and Affect: Mood normal.         Thought Content: Thought content normal.       Significant Labs: All pertinent labs within the past 24 hours have been reviewed.    Significant Imaging: I have reviewed all pertinent imaging results/findings within the past 24 hours.

## 2022-11-13 NOTE — HOSPITAL COURSE
11/12:  Patient reports some vision improvement with IV steroids.  Will continue the course per recommendation of Neurology.    She will need close follow-up with Neurology.  Given the small chance of a possible stroke I will order echo and carotid Dopplers.    11/13:  Vision continues to improve.  Carotid Doppler and echo were relatively normal except increased PA pressure which may be related to sleep apnea this patient with obesity.    11/14--Pt with non new issues or concerns, States is still having some blurred vision but overall her vision is better.  Pt will need close neurology follow up and appt scheduled for 11:45 this AM with Dr. Noel.  Pt has home cytomel that she will continue as directed by her [facility] provider.  Will have outpatient sleep study to eval for sleep apnea.  Repeat BMP to ensure renal function returns to baseline.  Pt has met the maximum benefit from this hospitalization and is stable for discharge.    Patient counseled on the importance of keeping all hospital follow up appointments and compliance with medications, therapies, or devices as prescribed in order to provide for the best health outcomes.    Additionally, patient given written literature regarding their current disease processes and home care recommendations.   Patient given opportunity to ask questions and verbalized understanding of all information discussed.

## 2022-11-14 ENCOUNTER — OFFICE VISIT (OUTPATIENT)
Dept: NEUROLOGY | Facility: CLINIC | Age: 37
End: 2022-11-14
Payer: MEDICAID

## 2022-11-14 VITALS
DIASTOLIC BLOOD PRESSURE: 90 MMHG | RESPIRATION RATE: 18 BRPM | BODY MASS INDEX: 44.41 KG/M2 | HEART RATE: 67 BPM | WEIGHT: 293 LBS | OXYGEN SATURATION: 96 % | HEIGHT: 68 IN | SYSTOLIC BLOOD PRESSURE: 140 MMHG | TEMPERATURE: 99 F

## 2022-11-14 VITALS
HEIGHT: 68 IN | HEART RATE: 96 BPM | DIASTOLIC BLOOD PRESSURE: 76 MMHG | WEIGHT: 293 LBS | SYSTOLIC BLOOD PRESSURE: 128 MMHG | OXYGEN SATURATION: 97 % | BODY MASS INDEX: 44.41 KG/M2

## 2022-11-14 DIAGNOSIS — G35 MULTIPLE SCLEROSIS: ICD-10-CM

## 2022-11-14 DIAGNOSIS — H46.9 OPTIC NEURITIS: Primary | ICD-10-CM

## 2022-11-14 LAB
ANION GAP SERPL CALCULATED.3IONS-SCNC: 14 MMOL/L (ref 7–16)
BASOPHILS # BLD AUTO: 0.01 K/UL (ref 0–0.2)
BASOPHILS NFR BLD AUTO: 0.1 % (ref 0–1)
BUN SERPL-MCNC: 14 MG/DL (ref 7–18)
BUN/CREAT SERPL: 13 (ref 6–20)
CALCIUM SERPL-MCNC: 8.4 MG/DL (ref 8.5–10.1)
CHLORIDE SERPL-SCNC: 105 MMOL/L (ref 98–107)
CO2 SERPL-SCNC: 26 MMOL/L (ref 21–32)
CREAT SERPL-MCNC: 1.11 MG/DL (ref 0.55–1.02)
DIFFERENTIAL METHOD BLD: ABNORMAL
EGFR (NO RACE VARIABLE) (RUSH/TITUS): 67 ML/MIN/1.73M²
EOSINOPHIL # BLD AUTO: 0 K/UL (ref 0–0.5)
EOSINOPHIL NFR BLD AUTO: 0 % (ref 1–4)
ERYTHROCYTE [DISTWIDTH] IN BLOOD BY AUTOMATED COUNT: 12.7 % (ref 11.5–14.5)
GLUCOSE SERPL-MCNC: 116 MG/DL (ref 74–106)
HCT VFR BLD AUTO: 42.3 % (ref 38–47)
HGB BLD-MCNC: 13.6 G/DL (ref 12–16)
IMM GRANULOCYTES # BLD AUTO: 0.06 K/UL (ref 0–0.04)
IMM GRANULOCYTES NFR BLD: 0.4 % (ref 0–0.4)
LYMPHOCYTES # BLD AUTO: 1.04 K/UL (ref 1–4.8)
LYMPHOCYTES NFR BLD AUTO: 7.1 % (ref 27–41)
LYMPHOCYTES NFR BLD MANUAL: 9 % (ref 27–41)
MCH RBC QN AUTO: 29.6 PG (ref 27–31)
MCHC RBC AUTO-ENTMCNC: 32.2 G/DL (ref 32–36)
MCV RBC AUTO: 92 FL (ref 80–96)
MONOCYTES # BLD AUTO: 0.2 K/UL (ref 0–0.8)
MONOCYTES NFR BLD AUTO: 1.4 % (ref 2–6)
MONOCYTES NFR BLD MANUAL: 2 % (ref 2–6)
MPC BLD CALC-MCNC: 10.3 FL (ref 9.4–12.4)
NEUTROPHILS # BLD AUTO: 13.37 K/UL (ref 1.8–7.7)
NEUTROPHILS NFR BLD AUTO: 91 % (ref 53–65)
NEUTS BAND NFR BLD MANUAL: 1 % (ref 1–5)
NEUTS SEG NFR BLD MANUAL: 88 % (ref 50–62)
NRBC # BLD AUTO: 0 X10E3/UL
NRBC, AUTO (.00): 0 %
PLATELET # BLD AUTO: 305 K/UL (ref 150–400)
PLATELET MORPHOLOGY: NORMAL
POTASSIUM SERPL-SCNC: 4.3 MMOL/L (ref 3.5–5.1)
RBC # BLD AUTO: 4.6 M/UL (ref 4.2–5.4)
RBC MORPH BLD: NORMAL
SODIUM SERPL-SCNC: 141 MMOL/L (ref 136–145)
WBC # BLD AUTO: 14.68 K/UL (ref 4.5–11)

## 2022-11-14 PROCEDURE — 85025 COMPLETE CBC W/AUTO DIFF WBC: CPT | Performed by: HOSPITALIST

## 2022-11-14 PROCEDURE — 1159F PR MEDICATION LIST DOCUMENTED IN MEDICAL RECORD: ICD-10-PCS | Mod: CPTII,,, | Performed by: PSYCHIATRY & NEUROLOGY

## 2022-11-14 PROCEDURE — 1159F MED LIST DOCD IN RCRD: CPT | Mod: CPTII,,, | Performed by: PSYCHIATRY & NEUROLOGY

## 2022-11-14 PROCEDURE — 3008F PR BODY MASS INDEX (BMI) DOCUMENTED: ICD-10-PCS | Mod: CPTII,,, | Performed by: PSYCHIATRY & NEUROLOGY

## 2022-11-14 PROCEDURE — 3008F BODY MASS INDEX DOCD: CPT | Mod: CPTII,,, | Performed by: PSYCHIATRY & NEUROLOGY

## 2022-11-14 PROCEDURE — G0378 HOSPITAL OBSERVATION PER HR: HCPCS

## 2022-11-14 PROCEDURE — 99204 OFFICE O/P NEW MOD 45 MIN: CPT | Mod: S$PBB,,, | Performed by: PSYCHIATRY & NEUROLOGY

## 2022-11-14 PROCEDURE — 63600175 PHARM REV CODE 636 W HCPCS: Performed by: INTERNAL MEDICINE

## 2022-11-14 PROCEDURE — 1160F PR REVIEW ALL MEDS BY PRESCRIBER/CLIN PHARMACIST DOCUMENTED: ICD-10-PCS | Mod: CPTII,,, | Performed by: PSYCHIATRY & NEUROLOGY

## 2022-11-14 PROCEDURE — 99204 PR OFFICE/OUTPT VISIT, NEW, LEVL IV, 45-59 MIN: ICD-10-PCS | Mod: S$PBB,,, | Performed by: PSYCHIATRY & NEUROLOGY

## 2022-11-14 PROCEDURE — 99214 OFFICE O/P EST MOD 30 MIN: CPT | Mod: ,,, | Performed by: HOSPITALIST

## 2022-11-14 PROCEDURE — 3074F SYST BP LT 130 MM HG: CPT | Mod: CPTII,,, | Performed by: PSYCHIATRY & NEUROLOGY

## 2022-11-14 PROCEDURE — 25000003 PHARM REV CODE 250: Performed by: INTERNAL MEDICINE

## 2022-11-14 PROCEDURE — 96376 TX/PRO/DX INJ SAME DRUG ADON: CPT | Mod: 59

## 2022-11-14 PROCEDURE — 99214 PR OFFICE/OUTPT VISIT, EST, LEVL IV, 30-39 MIN: ICD-10-PCS | Mod: ,,, | Performed by: HOSPITALIST

## 2022-11-14 PROCEDURE — 1160F RVW MEDS BY RX/DR IN RCRD: CPT | Mod: CPTII,,, | Performed by: PSYCHIATRY & NEUROLOGY

## 2022-11-14 PROCEDURE — 99213 OFFICE O/P EST LOW 20 MIN: CPT | Mod: PBBFAC | Performed by: PSYCHIATRY & NEUROLOGY

## 2022-11-14 PROCEDURE — 80048 BASIC METABOLIC PNL TOTAL CA: CPT | Performed by: HOSPITALIST

## 2022-11-14 PROCEDURE — 36415 COLL VENOUS BLD VENIPUNCTURE: CPT | Performed by: HOSPITALIST

## 2022-11-14 PROCEDURE — 3078F DIAST BP <80 MM HG: CPT | Mod: CPTII,,, | Performed by: PSYCHIATRY & NEUROLOGY

## 2022-11-14 PROCEDURE — 3074F PR MOST RECENT SYSTOLIC BLOOD PRESSURE < 130 MM HG: ICD-10-PCS | Mod: CPTII,,, | Performed by: PSYCHIATRY & NEUROLOGY

## 2022-11-14 PROCEDURE — 3078F PR MOST RECENT DIASTOLIC BLOOD PRESSURE < 80 MM HG: ICD-10-PCS | Mod: CPTII,,, | Performed by: PSYCHIATRY & NEUROLOGY

## 2022-11-14 RX ORDER — QUETIAPINE FUMARATE 25 MG/1
50 TABLET, FILM COATED ORAL NIGHTLY PRN
Status: DISCONTINUED | OUTPATIENT
Start: 2022-11-14 | End: 2022-11-14 | Stop reason: HOSPADM

## 2022-11-14 RX ORDER — NORGESTIMATE AND ETHINYL ESTRADIOL 0.25-0.035
1 KIT ORAL DAILY
COMMUNITY
Start: 2022-11-10

## 2022-11-14 RX ADMIN — METHYLPREDNISOLONE SODIUM SUCCINATE 250 MG: 125 INJECTION, POWDER, FOR SOLUTION INTRAMUSCULAR; INTRAVENOUS at 06:11

## 2022-11-14 RX ADMIN — METHYLPREDNISOLONE SODIUM SUCCINATE 250 MG: 125 INJECTION, POWDER, FOR SOLUTION INTRAMUSCULAR; INTRAVENOUS at 10:11

## 2022-11-14 RX ADMIN — BUPROPION HYDROCHLORIDE 150 MG: 150 TABLET, EXTENDED RELEASE ORAL at 09:11

## 2022-11-14 RX ADMIN — QUETIAPINE 50 MG: 25 TABLET ORAL at 12:11

## 2022-11-14 RX ADMIN — Medication 400 MG: at 09:11

## 2022-11-14 RX ADMIN — METHYLPREDNISOLONE SODIUM SUCCINATE 250 MG: 125 INJECTION, POWDER, FOR SOLUTION INTRAMUSCULAR; INTRAVENOUS at 12:11

## 2022-11-14 NOTE — NURSING
Discharge instructions reviewed with patient and spouse; and copy given to patient. Patient and spouse voiced understanding regarding:meds, appt., signs and symptoms to report to physician.

## 2022-11-14 NOTE — PLAN OF CARE
Problem: Adult Inpatient Plan of Care  Goal: Plan of Care Review  Outcome: Met  Goal: Patient-Specific Goal (Individualized)  Outcome: Met  Goal: Absence of Hospital-Acquired Illness or Injury  Outcome: Met  Goal: Optimal Comfort and Wellbeing  Outcome: Met  Goal: Readiness for Transition of Care  Outcome: Met     Problem: Bariatric Environmental Safety  Goal: Safety Maintained with Care  Outcome: Met     Problem: Pain Acute  Goal: Acceptable Pain Control and Functional Ability  Outcome: Met

## 2022-11-14 NOTE — DISCHARGE SUMMARY
[facility] Medicine  Discharge Summary      Patient Name: Jeanette Houston  MRN: [id number]  NAVJOT: [id number]  Patient Class: IP- Inpatient  Admission Date: 11/11/2022  Hospital Length of Stay: 2 days  Discharge Date and Time:  11/14/2022 9:21 AM  Attending Physician: Sera Shearer MD   Discharging Provider: LUIS E Guaman  Primary Care Provider: Kristyn Flood MD    Primary Care Team: Networked reference to record PCT     HPI:   Patient is a 37-year-old morbidly obese female with a history of follicular carcinoma of thyroid status post right lobectomy currently on Cytomel in preparation for GONZALEZ who presented to the emergency room after having been seen by retinal specialist  earlier today for left eye pain and blindness for the past 2 days. Retinal examination along with MRI was suggestive of a presence of optic neuritis and after consultation with , a neurologist, a decision was made to admit this patient to this hospital for high-dose steroid therapy.  Patient will be started on Solu-Medrol 250 mg IV q.6 hours times 3 days.      * No surgery found *      Hospital Course:   11/12:  Patient reports some vision improvement with IV steroids.  Will continue the course per recommendation of Neurology.    She will need close follow-up with Neurology.  Given the small chance of a possible stroke I will order echo and carotid Dopplers.    11/13:  Vision continues to improve.  Carotid Doppler and echo were relatively normal except increased PA pressure which may be related to sleep apnea this patient with obesity.    11/14--Pt with non new issues or concerns, States is still having some double vision in her left eye but overall her vision is better.  Pt will need close neurology follow up and appt scheduled for 11:45 this AM with Dr. Noel.  Pt has home cytomel that she will continue as directed by her [facility] provider.  Will have outpatient sleep study to chléo  for sleep apnea.  Repeat BMP to ensure renal function returns to baseline.  Follow up on CBC, elevated wbc, likely due to IV steroids but pt can repeat this in one week with pcp. Pt has met the maximum benefit from this hospitalization and is stable for discharge.    Patient counseled on the importance of keeping all hospital follow up appointments and compliance with medications, therapies, or devices as prescribed in order to provide for the best health outcomes.    Additionally, patient given written literature regarding their current disease processes and home care recommendations.   Patient given opportunity to ask questions and verbalized understanding of all information discussed.          Goals of Care Treatment Preferences:  Code Status: Full Code      Consults:     * Optic neuritis    Patient had a 5 day history L eye pain on performing extraocular movements and a 2 day history of complete blindness involving L eye.  Patient was subsequently seen by a retinal specialist , and after MRI, patient was diagnosed with optic neuritis.   recommended an admission with high dose steroid regimen for a period of 3 days.  Literature suggests that optic neuritis can sometimes be associated with multiple sclerosis, but patient does not have any symptoms suggestive of multiple sclerosis at this time.  Patient is to follow-up with Dr. Elise and Dr. Noel after discharge    11/12:  Most likely optic neuritis and there is some improvement with IV steroids.  CVA is a less likely explanation but I ordered echo and carotid Doppler.    11/13:  Patient requires urgent follow-up with neurologist.  Will not prescribe p.o. steroids on discharge.  Patient instructed to go to the nearest emergency department if she loses her vision again.    11/14--follow up with Dr. Noel this morning at 11:45        Depression    Adequately controlled on current regimen continue present management        Class 3  obesity    Body mass index is 48.94 kg/m². Morbid obesity complicates all aspects of disease management from diagnostic modalities to treatment. Weight loss encouraged and health benefits explained to patient.       11/14--counseling and literature given, weight loss diet      Follicular cancer of thyroid    Patient had resection of left lobe of thyroid in 2014 for multinodular goiter with symptoms associated with mass effect, but it was benign.  However, when she had resection of right lobe of thyroid in 2022, patient was found to have follicular cancer of thyroid.  Patient is currently being treated with Cytomel in preparation for GONZALEZ.  Patient is in septic follow-up with Dr. Gray at [facility] in [facility]        Final Active Diagnoses:    Diagnosis Date Noted POA    PRINCIPAL PROBLEM:  Optic neuritis [H46.9] 11/11/2022 Yes    Follicular cancer of thyroid [C73] 11/11/2022 Yes    Class 3 obesity [E66.01] 11/11/2022 Yes    Depression [F32.A] 11/11/2022 Yes      Problems Resolved During this Admission:       Discharged Condition: good    Disposition: Home or Self Care    Follow Up:   Follow-up Information       Henrik Noel MD Follow up.    Specialty: Neurology  Why: Keep scheduled appt for today at 11AM  Contact information:  [address] [facility] [address]  [phone]               Kristyn Flood MD Follow up in 1 week(s).    Specialty: Family Medicine  Why: Repeat BMP for renal function  Contact information:  [address]  [facility] [address]  [phone]                           Patient Instructions:      Ambulatory referral/consult to Sleep Disorders   Standing Status: Future   Referral Priority: Routine Referral Type: Consultation   Requested Specialty: Sleep Medicine   Number of Visits Requested: 1     Diet Adult Regular     Notify your health care provider if you experience any of the following:  temperature >100.4     Notify your health care provider if you experience any of  the following:  persistent nausea and vomiting or diarrhea     Notify your health care provider if you experience any of the following:  severe uncontrolled pain     Notify your health care provider if you experience any of the following:  redness, tenderness, or signs of infection (pain, swelling, redness, odor or green/yellow discharge around incision site)     Notify your health care provider if you experience any of the following:  difficulty breathing or increased cough     Notify your health care provider if you experience any of the following:  severe persistent headache     Notify your health care provider if you experience any of the following:  worsening rash     Notify your health care provider if you experience any of the following:  persistent dizziness, light-headedness, or visual disturbances     Notify your health care provider if you experience any of the following:  increased confusion or weakness     Activity as tolerated       Significant Diagnostic Studies: Labs:   BMP:   Recent Labs   Lab 11/14/22 0722   *      K 4.3      CO2 26   BUN 14   CREATININE 1.11*   CALCIUM 8.4*    and CBC No results for input(s): WBC, HGB, HCT, PLT in the last 48 hours.    Pending Diagnostic Studies:       Procedure Component Value Units Date/Time    CBC Auto Differential [[id number]] Collected: 11/14/22 0722    Order Status: Sent Lab Status: In process Updated: 11/14/22 0803    Specimen: Blood     Narrative:      The following orders were created for panel order CBC Auto Differential.  Procedure                               Abnormality         Status                     ---------                               -----------         ------                     CBC with Differential[[id number]]                            In process                 Manual Differential[[id number]]                              In process                   Please view results for these tests on the individual orders.     Manual Differential [[id number]] Collected: 11/14/22 0722    Order Status: Sent Lab Status: In process Updated: 11/14/22 0803    Specimen: Blood            Medications:  Reconciled Home Medications:      Medication List        CONTINUE taking these medications      buPROPion 150 MG TBSR 12 hr tablet  Commonly known as: WELLBUTRIN SR  Wellbutrin  MG Oral Tablet Extended Release 12 Hour QTY: 90 tablet Days: 90 Refills: 1  Written: 07/04/22 Patient Instructions: once a day     cholecalciferol (vitamin D3) 250 mcg (10,000 unit) Cap capsule  Commonly known as: VITAMIN D3  Vitamin D3 250 MCG (33995 UT) Oral Capsule QTY: 90 capsule Days: 90 Refills: 3  Written: 04/18/22 Patient Instructions: once a day     citalopram 40 MG tablet  Commonly known as: CeleXA  Take 40 mg by mouth once daily.     ibuprofen 800 MG tablet  Commonly known as: ADVIL,MOTRIN  Take 1 tablet (800 mg total) by mouth every 6 (six) hours as needed for Pain.     magnesium oxide 400 mg (241.3 mg magnesium) tablet  Commonly known as: MAG-OX  Take 1 tablet by mouth 2 (two) times daily.            STOP taking these medications      levothyroxine 100 MCG tablet  Commonly known as: SYNTHROID              Indwelling Lines/Drains at time of discharge:   Lines/Drains/Airways       None                   Time spent on the discharge of patient: >30 minutes         LUIS E Guaman  Department of Hospital Medicine  [facility]

## 2022-11-14 NOTE — ASSESSMENT & PLAN NOTE
Body mass index is 48.94 kg/m². Morbid obesity complicates all aspects of disease management from diagnostic modalities to treatment. Weight loss encouraged and health benefits explained to patient.       11/14--counseling and literature given, weight loss diet

## 2022-11-14 NOTE — ASSESSMENT & PLAN NOTE
Patient had a 5 day history L eye pain on performing extraocular movements and a 2 day history of complete blindness involving L eye.  Patient was subsequently seen by a retinal specialist , and after MRI, patient was diagnosed with optic neuritis.   recommended an admission with high dose steroid regimen for a period of 3 days.  Literature suggests that optic neuritis can sometimes be associated with multiple sclerosis, but patient does not have any symptoms suggestive of multiple sclerosis at this time.  Patient is to follow-up with Dr. Elise and Dr. Noel after discharge    11/12:  Most likely optic neuritis and there is some improvement with IV steroids.  CVA is a less likely explanation but I ordered echo and carotid Doppler.    11/13:  Patient requires urgent follow-up with neurologist.  Will not prescribe p.o. steroids on discharge.  Patient instructed to go to the nearest emergency department if she loses her vision again.    11/14--follow up with Dr. Noel this morning at 11:45

## 2022-11-14 NOTE — PATIENT INSTRUCTIONS
Repeat MRI brain w/ contrast- MULTIPLE SCLEROSIS protocol  May consider LP at later date - r/o oligoclonal bands for poss MULTIPLE SCLEROSIS  Ach recptor Ab test and NMO test- r/o MG  Wt reduction and incr physical activity  Will consider further IV steroids and poss plasma exchange if no improvement?  F/u one month

## 2022-11-14 NOTE — ASSESSMENT & PLAN NOTE
Patient had resection of left lobe of thyroid in 2014 for multinodular goiter with symptoms associated with mass effect, but it was benign.  However, when she had resection of right lobe of thyroid in 2022, patient was found to have follicular cancer of thyroid.  Patient is currently being treated with Cytomel in preparation for GONZALEZ.  Patient is in septic follow-up with Dr. Gray at [facility] in [facility]

## 2022-11-14 NOTE — PROGRESS NOTES
Subjective:       Patient ID: Jeanette Houston is a 37 y.o. female     Chief Complaint:    Chief Complaint   Patient presents with    Neurologic Problem     Optic neuritis        Allergies:  Paroxetine    Current Medications:    Outpatient Encounter Medications as of 11/14/2022   Medication Sig Dispense Refill    buPROPion (WELLBUTRIN SR) 150 MG TBSR 12 hr tablet Wellbutrin  MG Oral Tablet Extended Release 12 Hour QTY: 90 tablet Days: 90 Refills: 1  Written: 07/04/22 Patient Instructions: once a day      cholecalciferol, vitamin D3, (VITAMIN D3) 250 mcg (10,000 unit) Cap capsule Vitamin D3 250 MCG (35851 UT) Oral Capsule QTY: 90 capsule Days: 90 Refills: 3  Written: 04/18/22 Patient Instructions: once a day      citalopram (CELEXA) 40 MG tablet Take 40 mg by mouth once daily.      ESTARYLLA 0.25-35 mg-mcg per tablet Take 1 tablet by mouth once daily.      magnesium oxide (MAG-OX) 400 mg (241.3 mg magnesium) tablet Take 1 tablet by mouth 2 (two) times daily.      [DISCONTINUED] ibuprofen (ADVIL,MOTRIN) 800 MG tablet Take 1 tablet (800 mg total) by mouth every 6 (six) hours as needed for Pain. (Patient not taking: Reported on 11/14/2022) 20 tablet 0    [DISCONTINUED] levothyroxine (SYNTHROID) 100 MCG tablet Take 100 mcg by mouth before breakfast.       Facility-Administered Encounter Medications as of 11/14/2022   Medication Dose Route Frequency Provider Last Rate Last Admin    [DISCONTINUED] buPROPion TBSR 12 hr tablet 150 mg  150 mg Oral Daily Min DANY Ambrose MD   150 mg at 11/14/22 0942    [DISCONTINUED] citalopram tablet 40 mg  40 mg Oral Daily PM Min DANY Ambrose MD   40 mg at 11/13/22 1717    [DISCONTINUED] HYDROcodone-acetaminophen 7.5-325 mg per tablet 1 tablet  1 tablet Oral Q6H PRN Bib Ambrose MD   1 tablet at 11/12/22 2322    [DISCONTINUED] Liothyronine 2.5 mcg  2.5 mcg Oral BID Bib Ambrose MD   2.5 mcg at 11/14/22 0943    [DISCONTINUED] magnesium oxide tablet 400 mg  400 mg Oral BID Bib Ambrose MD   400 mg at  11/14/22 0942    [DISCONTINUED] methylPREDNISolone sodium succinate injection 250 mg  250 mg Intravenous Q6H Min DANY Ambrose MD   250 mg at 11/14/22 1050    [DISCONTINUED] naloxone 0.4 mg/mL injection 0.02 mg  0.02 mg Intravenous PRN Bib Ambrose MD        [DISCONTINUED] ondansetron injection 4 mg  4 mg Intravenous Q8H PRN Bib Ambrose MD        [DISCONTINUED] QUEtiapine tablet 50 mg  50 mg Oral Nightly PRN Bib Ambrose MD   50 mg at 11/14/22 0033    [DISCONTINUED] sodium chloride 0.9% flush 10 mL  10 mL Intravenous Q12H PRN Bib Ambrose MD        [DISCONTINUED] trazodone split tablet 25 mg  25 mg Oral Nightly PRN Bib Ambrose MD   25 mg at 11/13/22 0010       History of Present Illness  Patient is a 37-year-old female referred by Dr. Elise after having an acute episode of optic neuritis one week ago.  Patient went to the ER 3 days ago to be admitted for 3 days of IV Solu-Medrol which she just completed and she states helped her symptoms considerably and salvaged and returned her vision and stopped the pain.  Per Ophthalmology she had a L afferent pupillary defect and per Opthalmo [facility] showed only an ovoid area of white matter lesion in the cerebellum but no pathognomonic radiologic findings consistent with demyelination. However I have no verified results for review?  Patient was discharged from the hospital today and immediately brought to my office for evaluation.   She has a significant medical history for morbid obesity, depression, anxiety, follicular cancer of the thyroid, hypertension.   Now pt having L ptosis and diplopia but denies any trouble swallowing or choking- some concern also for MYASTHENIA GRAVIS or NMO ? Although she has no symptoms of myelitis- no weakness or sensory changes in her ext's for the latter  Oral steroids have shown to actually increase risk of future episodes of optic neuritis - most of time optic neuritis improves on it own w/ or w/o steroids            Past Medical  "History:   Diagnosis Date    Depression     Follicular cancer of thyroid        Past Surgical History:   Procedure Laterality Date    THYROIDECTOMY, BILATERAL Bilateral        Social History  Ms. Houston  reports that she has never smoked. She has never used smokeless tobacco.    Family History  Ms.'s Houston family history is not on file.    Review of Systems  Review of Systems   Eyes:  Positive for blurred vision and pain.   Psychiatric/Behavioral:  Positive for depression. The patient is nervous/anxious.    All other systems reviewed and are negative.   Objective:   /76 (BP Location: Left arm, Patient Position: Sitting, BP Method: Large (Manual))   Pulse 96   Ht 5' 8" (1.727 m)   Wt (!) 141.5 kg (312 lb)   LMP 11/07/2022 (Approximate)   SpO2 97%   BMI 47.44 kg/m²    NEUROLOGICAL EXAMINATION:     MENTAL STATUS   Oriented to person, place, and time.   Level of consciousness: alert  Knowledge: consistent with education.     CRANIAL NERVES   Cranial nerves II through XII intact.        Mild L Afferent pupillary defect   L exotropia - mild      MOTOR EXAM     Strength   Strength 5/5 throughout.     GAIT AND COORDINATION     Gait  Gait: normal     Physical Exam  Vitals reviewed.   Constitutional:       Appearance: She is obese.   Neurological:      Mental Status: She is alert and oriented to person, place, and time. Mental status is at baseline.      Cranial Nerves: Cranial nerves 2-12 are intact.      Motor: Motor strength is normal.      Gait: Gait is intact.        Assessment:     Optic neuritis  -     Acetylcholine Receptor, Binding; Future; Expected date: 11/14/2022  -     NMO AQUAPORIN-4-IGG, SERUM; Future; Expected date: 11/14/2022    Multiple sclerosis  -     MRI Brain W WO Contrast; Future; Expected date: 11/14/2022       Primary Diagnosis and ICD10  Optic neuritis [H46.9]    Plan:     Patient Instructions   Repeat MRI brain w/ contrast- MULTIPLE SCLEROSIS protocol  May consider LP at later date - r/o " oligoclonal bands for poss MULTIPLE SCLEROSIS  Ach recptor Ab test and NMO test- r/o MG  Wt reduction and incr physical activity  Will consider further IV steroids and poss plasma exchange if no improvement?  F/u one month     Medications Discontinued During This Encounter   Medication Reason    ibuprofen (ADVIL,MOTRIN) 800 MG tablet Patient no longer taking       Requested Prescriptions      No prescriptions requested or ordered in this encounter

## 2022-11-15 ENCOUNTER — TELEPHONE (OUTPATIENT)
Dept: ORTHOPEDICS | Facility: HOSPITAL | Age: 37
End: 2022-11-15
Payer: MEDICAID

## 2022-11-17 ENCOUNTER — TELEPHONE (OUTPATIENT)
Dept: ORTHOPEDICS | Facility: HOSPITAL | Age: 37
End: 2022-11-17
Payer: MEDICAID

## 2023-01-03 ENCOUNTER — PROCEDURE VISIT (OUTPATIENT)
Dept: SLEEP MEDICINE | Facility: HOSPITAL | Age: 38
End: 2023-01-03
Payer: MEDICAID

## 2023-01-03 DIAGNOSIS — G47.10 HYPERSOMNIA, UNSPECIFIED: ICD-10-CM

## 2023-01-03 DIAGNOSIS — G47.30 SLEEP APNEA, UNSPECIFIED: ICD-10-CM

## 2023-01-03 DIAGNOSIS — G47.30 SLEEP APNEA, UNSPECIFIED: Primary | ICD-10-CM

## 2023-01-03 PROCEDURE — 95810 POLYSOM 6/> YRS 4/> PARAM: CPT | Mod: PO

## 2023-01-06 ENCOUNTER — OFFICE VISIT (OUTPATIENT)
Dept: NEUROLOGY | Facility: CLINIC | Age: 38
End: 2023-01-06
Payer: MEDICAID

## 2023-01-06 VITALS
WEIGHT: 293 LBS | DIASTOLIC BLOOD PRESSURE: 78 MMHG | BODY MASS INDEX: 45.99 KG/M2 | SYSTOLIC BLOOD PRESSURE: 142 MMHG | HEIGHT: 67 IN | HEART RATE: 80 BPM | OXYGEN SATURATION: 99 %

## 2023-01-06 DIAGNOSIS — H46.9 OPTIC NEURITIS: Primary | ICD-10-CM

## 2023-01-06 PROCEDURE — 1159F MED LIST DOCD IN RCRD: CPT | Mod: CPTII,,, | Performed by: PSYCHIATRY & NEUROLOGY

## 2023-01-06 PROCEDURE — 3078F DIAST BP <80 MM HG: CPT | Mod: CPTII,,, | Performed by: PSYCHIATRY & NEUROLOGY

## 2023-01-06 PROCEDURE — 3008F BODY MASS INDEX DOCD: CPT | Mod: CPTII,,, | Performed by: PSYCHIATRY & NEUROLOGY

## 2023-01-06 PROCEDURE — 3078F PR MOST RECENT DIASTOLIC BLOOD PRESSURE < 80 MM HG: ICD-10-PCS | Mod: CPTII,,, | Performed by: PSYCHIATRY & NEUROLOGY

## 2023-01-06 PROCEDURE — 99214 OFFICE O/P EST MOD 30 MIN: CPT | Mod: S$PBB,,, | Performed by: PSYCHIATRY & NEUROLOGY

## 2023-01-06 PROCEDURE — 3077F SYST BP >= 140 MM HG: CPT | Mod: CPTII,,, | Performed by: PSYCHIATRY & NEUROLOGY

## 2023-01-06 PROCEDURE — 1159F PR MEDICATION LIST DOCUMENTED IN MEDICAL RECORD: ICD-10-PCS | Mod: CPTII,,, | Performed by: PSYCHIATRY & NEUROLOGY

## 2023-01-06 PROCEDURE — 1160F RVW MEDS BY RX/DR IN RCRD: CPT | Mod: CPTII,,, | Performed by: PSYCHIATRY & NEUROLOGY

## 2023-01-06 PROCEDURE — 3077F PR MOST RECENT SYSTOLIC BLOOD PRESSURE >= 140 MM HG: ICD-10-PCS | Mod: CPTII,,, | Performed by: PSYCHIATRY & NEUROLOGY

## 2023-01-06 PROCEDURE — 1160F PR REVIEW ALL MEDS BY PRESCRIBER/CLIN PHARMACIST DOCUMENTED: ICD-10-PCS | Mod: CPTII,,, | Performed by: PSYCHIATRY & NEUROLOGY

## 2023-01-06 PROCEDURE — 99214 PR OFFICE/OUTPT VISIT, EST, LEVL IV, 30-39 MIN: ICD-10-PCS | Mod: S$PBB,,, | Performed by: PSYCHIATRY & NEUROLOGY

## 2023-01-06 PROCEDURE — 3008F PR BODY MASS INDEX (BMI) DOCUMENTED: ICD-10-PCS | Mod: CPTII,,, | Performed by: PSYCHIATRY & NEUROLOGY

## 2023-01-06 PROCEDURE — 99214 OFFICE O/P EST MOD 30 MIN: CPT | Mod: PBBFAC | Performed by: PSYCHIATRY & NEUROLOGY

## 2023-01-06 NOTE — PROGRESS NOTES
Subjective:       Patient ID: Jeanette Houston is a 37 y.o. female     Chief Complaint:    Chief Complaint   Patient presents with    Follow-up        Allergies:  Paroxetine    Current Medications:    Outpatient Encounter Medications as of 1/6/2023   Medication Sig Dispense Refill    buPROPion (WELLBUTRIN SR) 150 MG TBSR 12 hr tablet Wellbutrin  MG Oral Tablet Extended Release 12 Hour QTY: 90 tablet Days: 90 Refills: 1  Written: 07/04/22 Patient Instructions: once a day      cholecalciferol, vitamin D3, (VITAMIN D3) 250 mcg (10,000 unit) Cap capsule Vitamin D3 250 MCG (38486 UT) Oral Capsule QTY: 90 capsule Days: 90 Refills: 3  Written: 04/18/22 Patient Instructions: once a day      citalopram (CELEXA) 40 MG tablet Take 40 mg by mouth once daily.      ESTARYLLA 0.25-35 mg-mcg per tablet Take 1 tablet by mouth once daily.      magnesium oxide (MAG-OX) 400 mg (241.3 mg magnesium) tablet Take 1 tablet by mouth 2 (two) times daily.       No facility-administered encounter medications on file as of 1/6/2023.       History of Present Illness  37-year-old black female with morbid obesity and episode of possible optic neuritis this past fall-patient was suspect for multiple sclerosis although she was never dx and  Her recent MRI of the brain showed nothing significant or specific for demyelinating disorder and only a very nonspecific incidental finding of a very small left focal white matter lesion but nothing acute.  Patient had complained of some possible vision blurriness or double vision as well as some trouble choking or swallowing but acetylcholine receptor antibody testing was negative thus ruling out myasthenia gravis.  Additionally patient is NMO aqua porin four lab was negative to essentially rule out neuromyelitis optica.  Patient missed her scheduled follow-up appointment 2 months ago but previously stated that her doses of IV Solu-Medrol had improved her symptoms considerably.  Currently pt sees well  "from L eye and no vision issues - she will be OK to start Rad therapy for thyroid cancer - her above symptoms began fairly soon after her thyroid surgery last fall and starting new thyroid medication- likely correlation or culprit?       Past Medical History:   Diagnosis Date    Depression     Follicular cancer of thyroid        Past Surgical History:   Procedure Laterality Date    THYROIDECTOMY, BILATERAL Bilateral        Social History  Ms. Houston  reports that she has never smoked. She has never used smokeless tobacco. She reports current alcohol use. She reports that she does not use drugs.    Family History  Ms.'s Houston family history is not on file.    Review of Systems  Review of Systems   Psychiatric/Behavioral:  Positive for depression. The patient is nervous/anxious.    All other systems reviewed and are negative.   Objective:   BP (!) 142/78 (BP Location: Left arm, Patient Position: Sitting, BP Method: Large (Manual))   Pulse 80   Ht 5' 7" (1.702 m)   Wt (!) 146.1 kg (322 lb 1.6 oz)   SpO2 99%   BMI 50.45 kg/m²    NEUROLOGICAL EXAMINATION:     MENTAL STATUS   Oriented to person, place, and time.   Speech: speech is normal   Level of consciousness: alert  Knowledge: good.     CRANIAL NERVES   Cranial nerves II through XII intact.     MOTOR EXAM     Strength   Strength 5/5 throughout.     GAIT AND COORDINATION     Gait  Gait: normal     Physical Exam  Vitals reviewed.   Constitutional:       Appearance: She is obese.   Neurological:      General: No focal deficit present.      Mental Status: She is alert and oriented to person, place, and time. Mental status is at baseline.      Cranial Nerves: Cranial nerves 2-12 are intact.      Motor: Motor strength is normal.      Gait: Gait is intact.   Psychiatric:         Speech: Speech normal.        Assessment:     Optic neuritis         Primary Diagnosis and ICD10  Optic neuritis [H46.9]    Plan:     Patient Instructions   Pt OK to undergo XRT at [facility] " thyroid cancer  Healthy lifestyle habits   F/u prn    There are no discontinued medications.    Requested Prescriptions      No prescriptions requested or ordered in this encounter

## 2024-04-01 ENCOUNTER — HOSPITAL ENCOUNTER (EMERGENCY)
Facility: HOSPITAL | Age: 39
Discharge: HOME | End: 2024-04-01
Payer: MEDICAID

## 2024-04-01 VITALS
HEIGHT: 68 IN | OXYGEN SATURATION: 99 % | DIASTOLIC BLOOD PRESSURE: 82 MMHG | SYSTOLIC BLOOD PRESSURE: 150 MMHG | WEIGHT: 293 LBS | TEMPERATURE: 100 F | HEART RATE: 99 BPM | BODY MASS INDEX: 44.41 KG/M2 | RESPIRATION RATE: 14 BRPM

## 2024-04-01 DIAGNOSIS — J02.9 PHARYNGITIS, UNSPECIFIED ETIOLOGY: Primary | ICD-10-CM

## 2024-04-01 PROCEDURE — 96372 THER/PROPH/DIAG INJ SC/IM: CPT | Performed by: NURSE PRACTITIONER

## 2024-04-01 PROCEDURE — 99284 EMERGENCY DEPT VISIT MOD MDM: CPT | Mod: 25

## 2024-04-01 PROCEDURE — 63600175 PHARM REV CODE 636 W HCPCS: Performed by: NURSE PRACTITIONER

## 2024-04-01 PROCEDURE — 99284 EMERGENCY DEPT VISIT MOD MDM: CPT | Mod: ,,, | Performed by: NURSE PRACTITIONER

## 2024-04-01 RX ORDER — KETOROLAC TROMETHAMINE 30 MG/ML
30 INJECTION, SOLUTION INTRAMUSCULAR; INTRAVENOUS
Status: COMPLETED | OUTPATIENT
Start: 2024-04-01 | End: 2024-04-01

## 2024-04-01 RX ORDER — CEFTRIAXONE 1 G/1
1 INJECTION, POWDER, FOR SOLUTION INTRAMUSCULAR; INTRAVENOUS
Status: COMPLETED | OUTPATIENT
Start: 2024-04-01 | End: 2024-04-01

## 2024-04-01 RX ORDER — AMOXICILLIN AND CLAVULANATE POTASSIUM 875; 125 MG/1; MG/1
1 TABLET, FILM COATED ORAL 2 TIMES DAILY
Qty: 14 TABLET | Refills: 0 | Status: SHIPPED | OUTPATIENT
Start: 2024-04-01

## 2024-04-01 RX ADMIN — KETOROLAC TROMETHAMINE 30 MG: 30 INJECTION, SOLUTION INTRAMUSCULAR at 07:04

## 2024-04-01 RX ADMIN — CEFTRIAXONE SODIUM 1 G: 1 INJECTION, POWDER, FOR SOLUTION INTRAMUSCULAR; INTRAVENOUS at 07:04

## 2024-04-01 NOTE — Clinical Note
"Jeanette Franklinteagan Houston was seen and treated in our emergency department on 4/1/2024.  She may return to work on 04/02/2024.       If you have any questions or concerns, please don't hesitate to call.      Sanam Ruiz FNP"

## 2024-04-01 NOTE — Clinical Note
"Jeanette Franklinley" Celso was seen and treated in our emergency department on 4/1/2024.  She may return to work on 04/03/2024.       If you have any questions or concerns, please don't hesitate to call.      Sanam Ruiz FNP"

## 2024-04-02 NOTE — ED PROVIDER NOTES
Encounter Date: 4/1/2024       History     Chief Complaint   Patient presents with    Sore Throat     Complains of sore throat not improved from prior ED visit. States negative swabs and labs yesterday.      37 y/o AAF presents to the emergency department with c/o sore throat. She states her symptoms started two days ago. She states on yesterday she developed a headache, body ache, fevers and chills as well as sore throat. She states that she had a CT scan of her head that was reportedly normal. She states she tested negative for COVID, Influenza and Strep. She states they also checked her urine and it was negative. She states she was given some IVF and Benadryl and something for pain. She states she did feel better but her throat is still significantly sore when she swallow. She denies shortness of breath, excess drooling, cough, n/v. She has taken tylenol and ibuprofen.     The history is provided by the patient.     Review of patient's allergies indicates:   Allergen Reactions    Paroxetine Anxiety     Past Medical History:   Diagnosis Date    Depression     Follicular cancer of thyroid      Past Surgical History:   Procedure Laterality Date    THYROIDECTOMY, BILATERAL Bilateral      No family history on file.  Social History     Tobacco Use    Smoking status: Never    Smokeless tobacco: Never   Substance Use Topics    Alcohol use: Yes    Drug use: Never     Review of Systems   All other systems reviewed and are negative.      Physical Exam     Initial Vitals [04/01/24 1858]   BP Pulse Resp Temp SpO2   (!) 150/82 99 14 99.7 °F (37.6 °C) 99 %      MAP       --         Physical Exam    Constitutional: She appears well-developed and well-nourished. She is Obese . She is cooperative.  Non-toxic appearance.   HENT:   Mouth/Throat: Mucous membranes are normal. Oropharyngeal exudate and posterior oropharyngeal erythema present. No posterior oropharyngeal edema or tonsillar abscesses.   Cardiovascular:  Normal rate,  regular rhythm and normal heart sounds.           Pulmonary/Chest: Effort normal and breath sounds normal.   Abdominal: Abdomen is soft. Bowel sounds are normal. There is no abdominal tenderness.     Neurological: She is alert and oriented to person, place, and time.   Skin: Skin is warm, dry and intact. Capillary refill takes less than 2 seconds.         Medical Screening Exam   See Full Note    ED Course   Procedures  Labs Reviewed - No data to display       Imaging Results    None          Medications   ketorolac injection 30 mg (30 mg Intramuscular Given 4/1/24 1948)   cefTRIAXone injection 1 g (1 g Intramuscular Given 4/1/24 1948)     Medical Decision Making  39 y/o AAF presents to the emergency department with c/o sore throat. She states her symptoms started two days ago. She states on yesterday she developed a headache, body ache, fevers and chills as well as sore throat. She states that she had a CT scan of her head that was reportedly normal. She states she tested negative for COVID, Influenza and Strep. She states they also checked her urine and it was negative. She states she was given some IVF and Benadryl and something for pain. She states she did feel better but her throat is still significantly sore when she swallow. She denies shortness of breath, excess drooling, cough, n/v. She has taken tylenol and ibuprofen.     Problems Addressed:  Pharyngitis, unspecified etiology:     Details: Rocephin and Toradol given. Rx for Augmentin, counseled on use and supportive measures. Follow up instructions given. Warning s/s discussed and return precautions given; the patient has v/u.      Risk  OTC drugs.  Prescription drug management.                                      Clinical Impression:   Final diagnoses:  [J02.9] Pharyngitis, unspecified etiology (Primary)        ED Disposition Condition    Discharge Stable          ED Prescriptions       Medication Sig Dispense Start Date End Date Auth. Provider     amoxicillin-clavulanate 875-125mg (AUGMENTIN) 875-125 mg per tablet Take 1 tablet by mouth 2 (two) times daily. 14 tablet 4/1/2024 -- Sanam Ruiz FNP          Follow-up Information       Follow up With Specialties Details Why Contact Info    Kristyn Flood MD Family Medicine  As needed [address]  [facility] [place]  [place] [address]  [phone]               Sanam Ruiz FNP  04/01/24 2012